# Patient Record
Sex: MALE | Race: WHITE | NOT HISPANIC OR LATINO | Employment: FULL TIME | URBAN - METROPOLITAN AREA
[De-identification: names, ages, dates, MRNs, and addresses within clinical notes are randomized per-mention and may not be internally consistent; named-entity substitution may affect disease eponyms.]

---

## 2017-04-07 ENCOUNTER — COMMUNICATION - HEALTHEAST (OUTPATIENT)
Dept: INTERNAL MEDICINE | Facility: CLINIC | Age: 52
End: 2017-04-07

## 2017-04-07 ENCOUNTER — OFFICE VISIT - HEALTHEAST (OUTPATIENT)
Dept: INTERNAL MEDICINE | Facility: CLINIC | Age: 52
End: 2017-04-07

## 2017-04-07 DIAGNOSIS — I25.10 CAD (CORONARY ARTERY DISEASE): ICD-10-CM

## 2017-04-07 LAB
CHOLEST SERPL-MCNC: 85 MG/DL
FASTING STATUS PATIENT QL REPORTED: YES
HDLC SERPL-MCNC: 25 MG/DL
LDLC SERPL CALC-MCNC: 41 MG/DL
TRIGL SERPL-MCNC: 97 MG/DL

## 2017-04-07 ASSESSMENT — MIFFLIN-ST. JEOR: SCORE: 1796.74

## 2017-05-05 ENCOUNTER — COMMUNICATION - HEALTHEAST (OUTPATIENT)
Dept: INTERNAL MEDICINE | Facility: CLINIC | Age: 52
End: 2017-05-05

## 2017-05-05 DIAGNOSIS — J44.9 COPD (CHRONIC OBSTRUCTIVE PULMONARY DISEASE) (H): ICD-10-CM

## 2017-05-10 ENCOUNTER — COMMUNICATION - HEALTHEAST (OUTPATIENT)
Dept: INTERNAL MEDICINE | Facility: CLINIC | Age: 52
End: 2017-05-10

## 2017-05-31 ENCOUNTER — COMMUNICATION - HEALTHEAST (OUTPATIENT)
Dept: INTERNAL MEDICINE | Facility: CLINIC | Age: 52
End: 2017-05-31

## 2017-07-21 ENCOUNTER — OFFICE VISIT - HEALTHEAST (OUTPATIENT)
Dept: INTERNAL MEDICINE | Facility: CLINIC | Age: 52
End: 2017-07-21

## 2017-07-21 DIAGNOSIS — I25.10 CORONARY ARTERY DISEASE INVOLVING NATIVE HEART WITHOUT ANGINA PECTORIS, UNSPECIFIED VESSEL OR LESION TYPE: ICD-10-CM

## 2017-07-21 LAB
CHOLEST SERPL-MCNC: 106 MG/DL
FASTING STATUS PATIENT QL REPORTED: YES
HDLC SERPL-MCNC: 35 MG/DL
LDLC SERPL CALC-MCNC: 51 MG/DL
TRIGL SERPL-MCNC: 98 MG/DL

## 2017-07-21 ASSESSMENT — MIFFLIN-ST. JEOR: SCORE: 1801.28

## 2017-07-24 ENCOUNTER — COMMUNICATION - HEALTHEAST (OUTPATIENT)
Dept: INTERNAL MEDICINE | Facility: CLINIC | Age: 52
End: 2017-07-24

## 2017-08-31 ENCOUNTER — RECORDS - HEALTHEAST (OUTPATIENT)
Dept: ADMINISTRATIVE | Facility: OTHER | Age: 52
End: 2017-08-31

## 2017-10-19 ENCOUNTER — COMMUNICATION - HEALTHEAST (OUTPATIENT)
Dept: INTERNAL MEDICINE | Facility: CLINIC | Age: 52
End: 2017-10-19

## 2017-10-20 ENCOUNTER — COMMUNICATION - HEALTHEAST (OUTPATIENT)
Dept: INTERNAL MEDICINE | Facility: CLINIC | Age: 52
End: 2017-10-20

## 2017-10-20 ENCOUNTER — OFFICE VISIT - HEALTHEAST (OUTPATIENT)
Dept: INTERNAL MEDICINE | Facility: CLINIC | Age: 52
End: 2017-10-20

## 2017-10-20 DIAGNOSIS — Z00.00 ROUTINE GENERAL MEDICAL EXAMINATION AT A HEALTH CARE FACILITY: ICD-10-CM

## 2017-10-20 LAB
CHOLEST SERPL-MCNC: 89 MG/DL
FASTING STATUS PATIENT QL REPORTED: YES
HDLC SERPL-MCNC: 32 MG/DL
LDLC SERPL CALC-MCNC: 38 MG/DL
PSA SERPL-MCNC: 0.4 NG/ML (ref 0–3.5)
TRIGL SERPL-MCNC: 96 MG/DL

## 2017-10-20 ASSESSMENT — MIFFLIN-ST. JEOR: SCORE: 1787.67

## 2017-10-27 ENCOUNTER — RECORDS - HEALTHEAST (OUTPATIENT)
Dept: ADMINISTRATIVE | Facility: OTHER | Age: 52
End: 2017-10-27

## 2018-01-03 ENCOUNTER — OFFICE VISIT - HEALTHEAST (OUTPATIENT)
Dept: INTERNAL MEDICINE | Facility: CLINIC | Age: 53
End: 2018-01-03

## 2018-01-03 DIAGNOSIS — J20.9 ACUTE BRONCHITIS: ICD-10-CM

## 2018-01-03 ASSESSMENT — MIFFLIN-ST. JEOR: SCORE: 1778.6

## 2018-04-26 ENCOUNTER — COMMUNICATION - HEALTHEAST (OUTPATIENT)
Dept: INTERNAL MEDICINE | Facility: CLINIC | Age: 53
End: 2018-04-26

## 2018-04-26 DIAGNOSIS — J44.9 COPD (CHRONIC OBSTRUCTIVE PULMONARY DISEASE) (H): ICD-10-CM

## 2018-08-28 ENCOUNTER — RECORDS - HEALTHEAST (OUTPATIENT)
Dept: ADMINISTRATIVE | Facility: OTHER | Age: 53
End: 2018-08-28

## 2019-01-10 ENCOUNTER — RECORDS - HEALTHEAST (OUTPATIENT)
Dept: ADMINISTRATIVE | Facility: OTHER | Age: 54
End: 2019-01-10

## 2019-01-10 ENCOUNTER — OFFICE VISIT - HEALTHEAST (OUTPATIENT)
Dept: INTERNAL MEDICINE | Facility: CLINIC | Age: 54
End: 2019-01-10

## 2019-01-10 DIAGNOSIS — Z00.00 ROUTINE GENERAL MEDICAL EXAMINATION AT A HEALTH CARE FACILITY: ICD-10-CM

## 2019-01-10 DIAGNOSIS — Z23 NEED FOR PROPHYLACTIC VACCINATION AND INOCULATION AGAINST INFLUENZA: ICD-10-CM

## 2019-01-10 LAB
ALBUMIN SERPL-MCNC: 4.2 G/DL (ref 3.5–5)
ALBUMIN UR-MCNC: NEGATIVE MG/DL
ALP SERPL-CCNC: 83 U/L (ref 45–120)
ALT SERPL W P-5'-P-CCNC: 19 U/L (ref 0–45)
ANION GAP SERPL CALCULATED.3IONS-SCNC: 9 MMOL/L (ref 5–18)
APPEARANCE UR: CLEAR
AST SERPL W P-5'-P-CCNC: 16 U/L (ref 0–40)
BILIRUB SERPL-MCNC: 0.9 MG/DL (ref 0–1)
BILIRUB UR QL STRIP: NEGATIVE
BUN SERPL-MCNC: 17 MG/DL (ref 8–22)
CALCIUM SERPL-MCNC: 9 MG/DL (ref 8.5–10.5)
CHLORIDE BLD-SCNC: 104 MMOL/L (ref 98–107)
CHOLEST SERPL-MCNC: 100 MG/DL
CO2 SERPL-SCNC: 27 MMOL/L (ref 22–31)
COLOR UR AUTO: YELLOW
CREAT SERPL-MCNC: 1.26 MG/DL (ref 0.7–1.3)
ERYTHROCYTE [DISTWIDTH] IN BLOOD BY AUTOMATED COUNT: 11.6 % (ref 11–14.5)
FASTING STATUS PATIENT QL REPORTED: ABNORMAL
GFR SERPL CREATININE-BSD FRML MDRD: 60 ML/MIN/1.73M2
GLUCOSE BLD-MCNC: 106 MG/DL (ref 70–125)
GLUCOSE UR STRIP-MCNC: NEGATIVE MG/DL
HCT VFR BLD AUTO: 46.5 % (ref 40–54)
HDLC SERPL-MCNC: 34 MG/DL
HGB BLD-MCNC: 15.5 G/DL (ref 14–18)
HGB UR QL STRIP: NEGATIVE
KETONES UR STRIP-MCNC: NEGATIVE MG/DL
LDLC SERPL CALC-MCNC: 44 MG/DL
LEUKOCYTE ESTERASE UR QL STRIP: NEGATIVE
MCH RBC QN AUTO: 29.5 PG (ref 27–34)
MCHC RBC AUTO-ENTMCNC: 33.4 G/DL (ref 32–36)
MCV RBC AUTO: 88 FL (ref 80–100)
NITRATE UR QL: NEGATIVE
PH UR STRIP: 7 [PH] (ref 5–8)
PLATELET # BLD AUTO: 268 THOU/UL (ref 140–440)
PMV BLD AUTO: 7.9 FL (ref 7–10)
POTASSIUM BLD-SCNC: 4.4 MMOL/L (ref 3.5–5)
PROT SERPL-MCNC: 7.1 G/DL (ref 6–8)
PSA SERPL-MCNC: 0.3 NG/ML (ref 0–3.5)
RBC # BLD AUTO: 5.26 MILL/UL (ref 4.4–6.2)
SODIUM SERPL-SCNC: 140 MMOL/L (ref 136–145)
SP GR UR STRIP: 1.01 (ref 1–1.03)
TRIGL SERPL-MCNC: 109 MG/DL
TSH SERPL DL<=0.005 MIU/L-ACNC: 2.12 UIU/ML (ref 0.3–5)
UROBILINOGEN UR STRIP-ACNC: NORMAL
WBC: 7.2 THOU/UL (ref 4–11)

## 2019-01-10 ASSESSMENT — MIFFLIN-ST. JEOR: SCORE: 1778.6

## 2019-01-11 ENCOUNTER — COMMUNICATION - HEALTHEAST (OUTPATIENT)
Dept: INTERNAL MEDICINE | Facility: CLINIC | Age: 54
End: 2019-01-11

## 2019-02-05 ENCOUNTER — COMMUNICATION - HEALTHEAST (OUTPATIENT)
Dept: INTERNAL MEDICINE | Facility: CLINIC | Age: 54
End: 2019-02-05

## 2019-02-05 DIAGNOSIS — J44.9 COPD (CHRONIC OBSTRUCTIVE PULMONARY DISEASE) (H): ICD-10-CM

## 2019-04-25 ENCOUNTER — COMMUNICATION - HEALTHEAST (OUTPATIENT)
Dept: SCHEDULING | Facility: CLINIC | Age: 54
End: 2019-04-25

## 2019-05-10 ENCOUNTER — RECORDS - HEALTHEAST (OUTPATIENT)
Dept: ADMINISTRATIVE | Facility: OTHER | Age: 54
End: 2019-05-10

## 2019-06-27 ENCOUNTER — RECORDS - HEALTHEAST (OUTPATIENT)
Dept: ADMINISTRATIVE | Facility: OTHER | Age: 54
End: 2019-06-27

## 2019-07-12 ENCOUNTER — RECORDS - HEALTHEAST (OUTPATIENT)
Dept: ADMINISTRATIVE | Facility: OTHER | Age: 54
End: 2019-07-12

## 2019-08-27 ENCOUNTER — RECORDS - HEALTHEAST (OUTPATIENT)
Dept: ADMINISTRATIVE | Facility: OTHER | Age: 54
End: 2019-08-27

## 2020-01-24 ENCOUNTER — OFFICE VISIT - HEALTHEAST (OUTPATIENT)
Dept: INTERNAL MEDICINE | Facility: CLINIC | Age: 55
End: 2020-01-24

## 2020-01-24 DIAGNOSIS — Z00.00 ROUTINE GENERAL MEDICAL EXAMINATION AT A HEALTH CARE FACILITY: ICD-10-CM

## 2020-01-24 LAB
ALBUMIN SERPL-MCNC: 4.2 G/DL (ref 3.5–5)
ALBUMIN UR-MCNC: NEGATIVE MG/DL
ALP SERPL-CCNC: 85 U/L (ref 45–120)
ALT SERPL W P-5'-P-CCNC: 20 U/L (ref 0–45)
ANION GAP SERPL CALCULATED.3IONS-SCNC: 10 MMOL/L (ref 5–18)
APPEARANCE UR: CLEAR
AST SERPL W P-5'-P-CCNC: 18 U/L (ref 0–40)
BILIRUB SERPL-MCNC: 1 MG/DL (ref 0–1)
BILIRUB UR QL STRIP: NEGATIVE
BUN SERPL-MCNC: 15 MG/DL (ref 8–22)
CALCIUM SERPL-MCNC: 9 MG/DL (ref 8.5–10.5)
CHLORIDE BLD-SCNC: 105 MMOL/L (ref 98–107)
CHOLEST SERPL-MCNC: 97 MG/DL
CO2 SERPL-SCNC: 26 MMOL/L (ref 22–31)
COLOR UR AUTO: YELLOW
CREAT SERPL-MCNC: 1.27 MG/DL (ref 0.7–1.3)
ERYTHROCYTE [DISTWIDTH] IN BLOOD BY AUTOMATED COUNT: 11.9 % (ref 11–14.5)
FASTING STATUS PATIENT QL REPORTED: YES
GFR SERPL CREATININE-BSD FRML MDRD: 59 ML/MIN/1.73M2
GLUCOSE BLD-MCNC: 99 MG/DL (ref 70–125)
GLUCOSE UR STRIP-MCNC: NEGATIVE MG/DL
HCT VFR BLD AUTO: 45.9 % (ref 40–54)
HDLC SERPL-MCNC: 36 MG/DL
HGB BLD-MCNC: 16 G/DL (ref 14–18)
HGB UR QL STRIP: NEGATIVE
KETONES UR STRIP-MCNC: NEGATIVE MG/DL
LDLC SERPL CALC-MCNC: 43 MG/DL
LEUKOCYTE ESTERASE UR QL STRIP: NEGATIVE
MCH RBC QN AUTO: 30.7 PG (ref 27–34)
MCHC RBC AUTO-ENTMCNC: 34.8 G/DL (ref 32–36)
MCV RBC AUTO: 88 FL (ref 80–100)
NITRATE UR QL: NEGATIVE
PH UR STRIP: 6 [PH] (ref 5–8)
PLATELET # BLD AUTO: 274 THOU/UL (ref 140–440)
PMV BLD AUTO: 8.5 FL (ref 7–10)
POTASSIUM BLD-SCNC: 4.1 MMOL/L (ref 3.5–5)
PROT SERPL-MCNC: 6.6 G/DL (ref 6–8)
PSA SERPL-MCNC: 0.3 NG/ML (ref 0–3.5)
RBC # BLD AUTO: 5.22 MILL/UL (ref 4.4–6.2)
SODIUM SERPL-SCNC: 141 MMOL/L (ref 136–145)
SP GR UR STRIP: 1.02 (ref 1–1.03)
TRIGL SERPL-MCNC: 88 MG/DL
TSH SERPL DL<=0.005 MIU/L-ACNC: 2.21 UIU/ML (ref 0.3–5)
UROBILINOGEN UR STRIP-ACNC: NORMAL
WBC: 7 THOU/UL (ref 4–11)

## 2020-01-24 ASSESSMENT — MIFFLIN-ST. JEOR: SCORE: 1764.99

## 2020-01-27 ENCOUNTER — COMMUNICATION - HEALTHEAST (OUTPATIENT)
Dept: INTERNAL MEDICINE | Facility: CLINIC | Age: 55
End: 2020-01-27

## 2020-02-13 ENCOUNTER — COMMUNICATION - HEALTHEAST (OUTPATIENT)
Dept: INTERNAL MEDICINE | Facility: CLINIC | Age: 55
End: 2020-02-13

## 2020-02-13 DIAGNOSIS — J44.9 COPD (CHRONIC OBSTRUCTIVE PULMONARY DISEASE) (H): ICD-10-CM

## 2020-02-16 ENCOUNTER — COMMUNICATION - HEALTHEAST (OUTPATIENT)
Dept: INTERNAL MEDICINE | Facility: CLINIC | Age: 55
End: 2020-02-16

## 2020-02-16 DIAGNOSIS — J44.9 COPD (CHRONIC OBSTRUCTIVE PULMONARY DISEASE) (H): ICD-10-CM

## 2020-10-01 ENCOUNTER — RECORDS - HEALTHEAST (OUTPATIENT)
Dept: ADMINISTRATIVE | Facility: OTHER | Age: 55
End: 2020-10-01

## 2020-10-14 ENCOUNTER — COMMUNICATION - HEALTHEAST (OUTPATIENT)
Dept: INTERNAL MEDICINE | Facility: CLINIC | Age: 55
End: 2020-10-14

## 2020-10-14 DIAGNOSIS — Z00.00 ROUTINE GENERAL MEDICAL EXAMINATION AT A HEALTH CARE FACILITY: ICD-10-CM

## 2020-10-17 RX ORDER — ATORVASTATIN CALCIUM 80 MG/1
TABLET, FILM COATED ORAL
Qty: 90 TABLET | Refills: 3 | Status: SHIPPED | OUTPATIENT
Start: 2020-10-17 | End: 2024-08-01

## 2020-10-18 ENCOUNTER — COMMUNICATION - HEALTHEAST (OUTPATIENT)
Dept: INTERNAL MEDICINE | Facility: CLINIC | Age: 55
End: 2020-10-18

## 2020-10-18 DIAGNOSIS — E88.810 METABOLIC SYNDROME: ICD-10-CM

## 2020-10-20 RX ORDER — LOSARTAN POTASSIUM 50 MG/1
TABLET ORAL
Qty: 90 TABLET | Refills: 0 | Status: SHIPPED | OUTPATIENT
Start: 2020-10-20 | End: 2022-04-26 | Stop reason: DRUGHIGH

## 2020-12-22 ENCOUNTER — RECORDS - HEALTHEAST (OUTPATIENT)
Dept: ADMINISTRATIVE | Facility: OTHER | Age: 55
End: 2020-12-22

## 2021-01-22 ENCOUNTER — RECORDS - HEALTHEAST (OUTPATIENT)
Dept: ADMINISTRATIVE | Facility: OTHER | Age: 56
End: 2021-01-22

## 2021-01-29 ENCOUNTER — OFFICE VISIT - HEALTHEAST (OUTPATIENT)
Dept: INTERNAL MEDICINE | Facility: CLINIC | Age: 56
End: 2021-01-29

## 2021-01-29 DIAGNOSIS — Z00.00 ROUTINE GENERAL MEDICAL EXAMINATION AT A HEALTH CARE FACILITY: ICD-10-CM

## 2021-01-29 LAB
ALBUMIN SERPL-MCNC: 4.1 G/DL (ref 3.5–5)
ALBUMIN UR-MCNC: NEGATIVE MG/DL
ALP SERPL-CCNC: 82 U/L (ref 45–120)
ALT SERPL W P-5'-P-CCNC: 21 U/L (ref 0–45)
ANION GAP SERPL CALCULATED.3IONS-SCNC: 10 MMOL/L (ref 5–18)
APPEARANCE UR: CLEAR
AST SERPL W P-5'-P-CCNC: 16 U/L (ref 0–40)
BILIRUB SERPL-MCNC: 0.8 MG/DL (ref 0–1)
BILIRUB UR QL STRIP: NEGATIVE
BUN SERPL-MCNC: 17 MG/DL (ref 8–22)
CALCIUM SERPL-MCNC: 9.3 MG/DL (ref 8.5–10.5)
CHLORIDE BLD-SCNC: 104 MMOL/L (ref 98–107)
CHOLEST SERPL-MCNC: 81 MG/DL
CO2 SERPL-SCNC: 26 MMOL/L (ref 22–31)
COLOR UR AUTO: YELLOW
CREAT SERPL-MCNC: 1.33 MG/DL (ref 0.7–1.3)
ERYTHROCYTE [DISTWIDTH] IN BLOOD BY AUTOMATED COUNT: 12.2 % (ref 11–14.5)
FASTING STATUS PATIENT QL REPORTED: YES
GFR SERPL CREATININE-BSD FRML MDRD: 56 ML/MIN/1.73M2
GLUCOSE BLD-MCNC: 95 MG/DL (ref 70–125)
GLUCOSE UR STRIP-MCNC: NEGATIVE MG/DL
HCT VFR BLD AUTO: 42.7 % (ref 40–54)
HDLC SERPL-MCNC: 31 MG/DL
HGB BLD-MCNC: 14.6 G/DL (ref 14–18)
HGB UR QL STRIP: NEGATIVE
KETONES UR STRIP-MCNC: NEGATIVE MG/DL
LDLC SERPL CALC-MCNC: 31 MG/DL
LEUKOCYTE ESTERASE UR QL STRIP: NEGATIVE
MCH RBC QN AUTO: 29.9 PG (ref 27–34)
MCHC RBC AUTO-ENTMCNC: 34.2 G/DL (ref 32–36)
MCV RBC AUTO: 87 FL (ref 80–100)
NITRATE UR QL: NEGATIVE
PH UR STRIP: 6.5 [PH] (ref 5–8)
PLATELET # BLD AUTO: 277 THOU/UL (ref 140–440)
PMV BLD AUTO: 10.7 FL (ref 7–10)
POTASSIUM BLD-SCNC: 4.6 MMOL/L (ref 3.5–5)
PROT SERPL-MCNC: 6.5 G/DL (ref 6–8)
PSA SERPL-MCNC: 0.4 NG/ML (ref 0–3.5)
RBC # BLD AUTO: 4.89 MILL/UL (ref 4.4–6.2)
SODIUM SERPL-SCNC: 140 MMOL/L (ref 136–145)
SP GR UR STRIP: 1.02 (ref 1–1.03)
TRIGL SERPL-MCNC: 95 MG/DL
TSH SERPL DL<=0.005 MIU/L-ACNC: 2.63 UIU/ML (ref 0.3–5)
UROBILINOGEN UR STRIP-ACNC: NORMAL
WBC: 6 THOU/UL (ref 4–11)

## 2021-01-29 ASSESSMENT — MIFFLIN-ST. JEOR: SCORE: 1774.06

## 2021-02-01 ENCOUNTER — COMMUNICATION - HEALTHEAST (OUTPATIENT)
Dept: INTERNAL MEDICINE | Facility: CLINIC | Age: 56
End: 2021-02-01

## 2021-02-10 ENCOUNTER — COMMUNICATION - HEALTHEAST (OUTPATIENT)
Dept: INTERNAL MEDICINE | Facility: CLINIC | Age: 56
End: 2021-02-10

## 2021-02-10 DIAGNOSIS — J44.9 COPD (CHRONIC OBSTRUCTIVE PULMONARY DISEASE) (H): ICD-10-CM

## 2021-03-21 ENCOUNTER — COMMUNICATION - HEALTHEAST (OUTPATIENT)
Dept: INTERNAL MEDICINE | Facility: CLINIC | Age: 56
End: 2021-03-21

## 2021-04-13 ENCOUNTER — RECORDS - HEALTHEAST (OUTPATIENT)
Dept: ADMINISTRATIVE | Facility: OTHER | Age: 56
End: 2021-04-13

## 2021-04-13 ENCOUNTER — COMMUNICATION - HEALTHEAST (OUTPATIENT)
Dept: INTERNAL MEDICINE | Facility: CLINIC | Age: 56
End: 2021-04-13

## 2021-04-20 ENCOUNTER — RECORDS - HEALTHEAST (OUTPATIENT)
Dept: ADMINISTRATIVE | Facility: OTHER | Age: 56
End: 2021-04-20

## 2021-05-16 ENCOUNTER — COMMUNICATION - HEALTHEAST (OUTPATIENT)
Dept: INTERNAL MEDICINE | Facility: CLINIC | Age: 56
End: 2021-05-16

## 2021-05-16 DIAGNOSIS — J44.9 COPD (CHRONIC OBSTRUCTIVE PULMONARY DISEASE) (H): ICD-10-CM

## 2021-05-27 ENCOUNTER — COMMUNICATION - HEALTHEAST (OUTPATIENT)
Dept: INTERNAL MEDICINE | Facility: CLINIC | Age: 56
End: 2021-05-27

## 2021-05-27 DIAGNOSIS — J44.9 COPD (CHRONIC OBSTRUCTIVE PULMONARY DISEASE) (H): ICD-10-CM

## 2021-05-28 NOTE — TELEPHONE ENCOUNTER
Took a puck the shin, just below the knee.  1.5 weeks ago.    Did not injure the ankle.He states he injured below the knee, and there was a bruise and swelling at the ankle.Swelled up after he got hit the puck.  He is concerned because the injury was right below the knee, but his ankle turned black and blue and became swelled up.  Patient advised that the bruise and swelling  Appeared at lower level , because of gravity in the body.     Went for a run the next day.He reports;   Bruising and ankle swelling has increased over the past week.    Patient advised to rest area with ice, elevation.  Also advised to go swimming, and then rotate between pool and hot tub, very therapeutic for soft  tissue injuries.  Patient advised to quit doing things like playing hockey, and also running.  Ok to keep being somewhat active, but to give it a rest right now until healed better.    Patient advised to call again if still   Having issues with healing.  Carmen Morse RN  [facility] Triage/refill nurse            Reason for Disposition    Thigh, calf, or ankle swelling in only one leg    Protocols used: LEG SWELLING AND EDEMA-A-OH

## 2021-05-30 VITALS — WEIGHT: 210 LBS | BODY MASS INDEX: 29.4 KG/M2 | HEIGHT: 71 IN

## 2021-05-31 VITALS — WEIGHT: 211 LBS | HEIGHT: 71 IN | BODY MASS INDEX: 29.54 KG/M2

## 2021-05-31 VITALS — WEIGHT: 206 LBS | BODY MASS INDEX: 28.84 KG/M2 | HEIGHT: 71 IN

## 2021-05-31 VITALS — HEIGHT: 71 IN | BODY MASS INDEX: 29.12 KG/M2 | WEIGHT: 208 LBS

## 2021-06-02 VITALS — BODY MASS INDEX: 28.84 KG/M2 | WEIGHT: 206 LBS | HEIGHT: 71 IN

## 2021-06-04 VITALS
WEIGHT: 203 LBS | HEART RATE: 60 BPM | DIASTOLIC BLOOD PRESSURE: 82 MMHG | BODY MASS INDEX: 28.42 KG/M2 | HEIGHT: 71 IN | OXYGEN SATURATION: 98 % | SYSTOLIC BLOOD PRESSURE: 134 MMHG

## 2021-06-05 VITALS
BODY MASS INDEX: 28.7 KG/M2 | TEMPERATURE: 97 F | SYSTOLIC BLOOD PRESSURE: 132 MMHG | HEIGHT: 71 IN | OXYGEN SATURATION: 99 % | WEIGHT: 205 LBS | HEART RATE: 55 BPM | DIASTOLIC BLOOD PRESSURE: 82 MMHG

## 2021-06-05 NOTE — PROGRESS NOTES
Office Visit - Physical    Oz Atkinson   54 y.o. male    Date of Visit: 2020    Chief Complaint   Patient presents with     Annual Exam     Physical Exam   fasting       Subjective: Physical examination.    54-year-old  here for physical examination.  Seen by Dr. Rodriguez on 2019 [facility] cardiology.  Questions regarding nasal injection.  Volume of breath is decreased.    Colonoscopy dated 2016 showed one isolated hyperplastic polyp with Dr. Ivonne Mendoza.    Non-smoker minimal alcohol no known drug allergies.    ROS: A comprehensive review of systems was performed and was otherwise negative    Medications:   Prior to Admission medications    Medication Sig Start Date End Date Taking? Authorizing Provider   ADVAIR DISKUS 250-50 mcg/dose DISKUS TAKE 1 PUFF BY MOUTH TWICE A DAY 19  Yes Carlos Ferreira MD   aspirin 81 mg chewable tablet 81 mg. 6/26/15  Yes PROVIDER, HISTORICAL   atorvastatin (LIPITOR) 80 MG tablet Take 80 mg by mouth. 6/26/15  Yes PROVIDER, HISTORICAL   losartan (COZAAR) 50 MG tablet Take 50 mg by mouth. 18  Yes PROVIDER, HISTORICAL       Allergies:No Known Allergies    Immunizations:   Immunization History   Administered Date(s) Administered     DT (pediatric) 1999     Influenza,seasonal quad, PF, =/> 6months 10/20/2017, 01/10/2019     Influenza,seasonal,quad inj =/> 6months 10/07/2016     Td,adult,historic,unspecified 2009     Tdap 2009       Health Maintenance: Immunizations reviewed and up-to-date.    Past Medical History: Prior history of coronary artery disease with 1 stent deployed to LAD after myocardial infarction.  History of obstructive sleep apnea and has not worn CPAP mask for over 6 months.    Past Surgical History: Tympanoplasty right side.    Tonsillectomy.    Family History: Mother  pancreatic cancer 67.    Father  heart disease and uremia at age 74.  3 children well wife of breast  cancer survivor ductal carcinoma [place] in situ.    Social History: Exercises regularly hockey.  .    Exam Chest clear to auscultation and percussion.  Heart tones regular rhythm without murmur rub or gallop.  Abdomen soft nontender no organomegaly.  No peritoneal signs.  Extremities free of edema cyanosis or clubbing.  Neck veins nondistended no thyromegaly or scleral icterus noted, carotids full.  Skin warm and dry easily conversant good spirited.  Normal intelligence.  Neurologically intact no gross localizing findings.  Skin negative lymph negative neuro negative psych normal HEENT negative back straight no severe spine tenderness rest examination negative in its entirety including genital rectal exam small prostate good pulse noted in all 4 extremities.  Eyes ears nose throat examination negative.    Assessment and Plan  General medical examination done  54 years of age with premature coronary artery disease history of myocardial infarction history of 1 stent deployed LAD 5 years prior followed by Dr. Rodriguez [facility] cardiology.  After 20 to 25 minutes of exercise the patient has a chest discomfort just to the left of the sternal border there is no rash.  Suggest stress nuclear study for further evaluation.  May require coronary arteriography.  Today as part of his full physical examination check hemogram comprehensive metabolic profile urinalysis lipid panel PSA TSH.  For further evaluation in terms of stress testing and coronary arteriography I advised the patient to reconsult his cardiologist Dr. Rodriguez at [facility].    The following high BMI interventions were performed this visit: encouragement to exercise    Carlos Ferreira MD    Patient Active Problem List   Diagnosis     Sinus Bradycardia     Essential Hypertension     Adult Sleep Apnea     General medical exam

## 2021-06-06 NOTE — TELEPHONE ENCOUNTER
RN cannot approve Refill Request    RN can NOT refill this medication Protocol failed and NO refill given. Last office visit: 7/21/2017 Carlos Ferreira MD Last Physical: 1/24/2020 Last MTM visit: Visit date not found Last visit same specialty: 1/3/2018 Nickolas Cary MD.  Next visit within 3 mo: Visit date not found  Next physical within 3 mo: Visit date not found      Sue Garcia, [facility] Triage/Med Refill 2/16/2020    Requested Prescriptions   Pending Prescriptions Disp Refills     ADVAIR DISKUS 250-50 mcg/dose DISKUS [Pharmacy Med Name: ADVAIR 250-50 DISKUS] 180 puff 3     Sig: TAKE 1 PUFF BY MOUTH TWICE A DAY       There is no refill protocol information for this order

## 2021-06-09 NOTE — PROGRESS NOTES
Office Visit - Follow up    Oz Atkinson   52 y.o. male    Date of Visit: 4/7/2017    Chief Complaint   Patient presents with     Coronary Artery Disease     Hypertension       Subjective: Nancy artery disease hypertension.  Fasting with hyperlipidemia as well.    Musculoskeletal chest wall pain for this  52 years of age.  The patient in June 2015 had a myocardial infarction hospitalized and cared for at [facility] one coronary stent deployed to the LAD.  The patient denies any chest pain with activity no shortness of breath no nausea or vomiting last night he ran 15 minutes then walked for an hour without chest pain.  No exertional syncope.  No exertional shortness of breath.    No blood in stool or urine medication list reviewed generally well-tolerated history of adult sleep apnea as well.    ROS: A comprehensive review of systems was performed and was otherwise negative    Medications:  Prior to Admission medications    Medication Sig Start Date End Date Taking? Authorizing Provider   aspirin 81 mg chewable tablet 81 mg. 6/26/15  Yes PROVIDER, HISTORICAL   atorvastatin (LIPITOR) 80 MG tablet Take 80 mg by mouth. 6/26/15  Yes PROVIDER, HISTORICAL   fluticasone-salmeterol (ADVAIR DISKUS) 250-50 mcg/dose DISKUS TAKE ONE PUFF BY MOUTH TWICE DAILY 8/19/16  Yes Carlos Ferreira MD   losartan (COZAAR) 25 MG tablet Take 1 tablet (25 mg total) by mouth daily. 8/5/15   Carlos Ferreira MD       Allergies: No Known Allergies    Immunizations:   Immunization History   Administered Date(s) Administered     DT (pediatric) 01/01/1999     Influenza, seasonal,quad inj 36+ mos 10/07/2016     Td, historic 06/11/2009     Tdap 06/11/2009       Exam Chest clear to auscultation and percussion.  Heart tones regular rhythm without murmur rub or gallop.  Abdomen soft nontender no organomegaly.  No peritoneal signs.  Extremities free of edema cyanosis or clubbing.  Neck veins nondistended no thyromegaly  or scleral icterus noted, carotids full.  Skin warm and dry easily conversant good spirited.  Normal intelligence.  Neurologically intact no gross localizing findings.    Assessment and Plan  Coronary artery disease with history of coronary stent deployed to LAD with hypertension and hyperlipidemia.  Recommend drive the LDL as low as possible keep blood pressure controlled diet and exercise.  Overweight BMI 29.71 discussed at length check lipid panel today.    Time: total time spent with the patient was 25 minutes of which >50% was spent in counseling and coordination of care    The following high BMI interventions were performed this visit: encouragement to exercise    Carlos Ferreira MD    Patient Active Problem List   Diagnosis     Sinus Bradycardia     Essential Hypertension     Adult Sleep Apnea     General medical exam

## 2021-06-12 NOTE — PROGRESS NOTES
Office Visit - Follow up    Oz Atkinson   52 y.o. male    Date of Visit: 7/21/2017    Chief Complaint   Patient presents with     Hypertension     Coronary Artery Disease     Medication Questions     advair       Subjective: Coronary artery disease with history of stent to LAD [facility].    History of hypertension as well as hyperlipidemia.  Questions regarding Advair for exercise-induced asthma answered.  Safety emphasized.  Sporadic chest pain related to stress test chest tightness.  Suggest follow-up with Dr. Apple Rodriguez at [facility] Department of cardiology.    No blood in stool or urine no chest pain with exertion no syncope no palpitations no shortness of breath with activity or at rest.    Medication list reviewed generally well-tolerated.    ROS: A comprehensive review of systems was performed and was otherwise negative    Medications:  Prior to Admission medications    Medication Sig Start Date End Date Taking? Authorizing Provider   aspirin 81 mg chewable tablet 81 mg. 6/26/15  Yes PROVIDER, HISTORICAL   atorvastatin (LIPITOR) 80 MG tablet Take 80 mg by mouth. 6/26/15  Yes PROVIDER, HISTORICAL   fluticasone-salmeterol (ADVAIR DISKUS) 250-50 mcg/dose DISKUS TAKE ONE PUFF BY MOUTH TWICE DAILY 5/5/17  Yes Loy Phillips MD   losartan (COZAAR) 25 MG tablet Take 1 tablet (25 mg total) by mouth daily. 8/5/15  Yes Carlos Ferreira MD       Allergies: No Known Allergies    Immunizations:   Immunization History   Administered Date(s) Administered     DT (pediatric) 01/01/1999     Influenza, seasonal,quad inj 36+ mos 10/07/2016     Td, historic 06/11/2009     Tdap 06/11/2009       Exam Chest clear to auscultation and percussion.  Heart tones regular rhythm without murmur rub or gallop.  Abdomen soft nontender no organomegaly.  No peritoneal signs.  Extremities free of edema cyanosis or clubbing.  Neck veins nondistended no thyromegaly or scleral icterus noted, carotids full.  Skin  warm and dry easily conversant good spirited.  Normal intelligence.  Neurologically intact no gross localizing findings.   by training.  And occupation.  Enjoys work.    Assessment and Plan  Coronary artery disease with history of coronary stenting LAD.  Stable.  Atypical chest pain suggest consultation again with consulting cardiologist Dr. Apple Rodriguez [facility] check lipid panel today.  Return to clinic October 2017 for full physical exam.    Hypertension and hyperlipidemia.  Weight up 1 pound.    Colon polyp hyperplastic in type see colonoscopy report September 27, 2016 Dr. Ivonne Sierra presiding.    Time: total time spent with the patient was 25 minutes of which >50% was spent in counseling and coordination of care    The following high BMI interventions were performed this visit: encouragement to exercise    Carlos Ferreira MD    Patient Active Problem List   Diagnosis     Sinus Bradycardia     Essential Hypertension     Adult Sleep Apnea     General medical exam

## 2021-06-12 NOTE — TELEPHONE ENCOUNTER
RN cannot approve Refill Request    RN can NOT refill this medication historical medication requested. Last office visit: 7/21/2017 Carlos Ferreira MD Last Physical: 1/24/2020 Last MTM visit: Visit date not found Last visit same specialty: 1/3/2018 Nickolas Cary MD.  Next visit within 3 mo: Visit date not found  Next physical within 3 mo: Visit date not found      Betty Manuel, [facility] Triage/Med Refill 10/20/2020    Requested Prescriptions   Pending Prescriptions Disp Refills     losartan (COZAAR) 50 MG tablet [Pharmacy Med Name: LOSARTAN POTASSIUM 50 MG TAB] 90 tablet 0     Sig: TAKE 1 TABLET BY MOUTH EVERY DAY       Angiotensin Receptor Blocker Protocol Passed - 10/20/2020  5:16 AM        Passed - PCP or prescribing provider visit in past 12 months       Last office visit with prescriber/PCP: 7/21/2017 Carlos Ferreira MD OR same dept: Visit date not found OR same specialty: 1/3/2018 Nickolas Cary MD  Last physical: 1/24/2020 Last MTM visit: Visit date not found   Next visit within 3 mo: Visit date not found  Next physical within 3 mo: Visit date not found  Prescriber OR PCP: Carlos Ferreira MD  Last diagnosis associated with med order: There are no diagnoses linked to this encounter.  If protocol passes may refill for 12 months if within 3 months of last provider visit (or a total of 15 months).             Passed - Serum potassium within the past 12 months     Lab Results   Component Value Date    Potassium 4.1 01/24/2020             Passed - Blood pressure filed in past 12 months     BP Readings from Last 1 Encounters:   01/24/20 134/82             Passed - Serum creatinine within the past 12 months     Creatinine   Date Value Ref Range Status   01/24/2020 1.27 0.70 - 1.30 mg/dL Final

## 2021-06-13 NOTE — PROGRESS NOTES
Office Visit - Physical    Oz Atkinson   52 y.o. male    Date of Visit: 10/20/2017    Chief Complaint   Patient presents with     Annual Exam     Physical Exam   fasting       Subjective: Physical exam.    52-year-old  here for physical examination.  Occasional chest twinge history of coronary disease with stent to LAD.  Easily relieved with rest.    Colonoscopy showed normal findings except for one hyperplastic polyp date of colonoscopy with Dr. Ivonne Sierra September 2, 2016.    Non-smoker light alcohol allergies none known.  Questions regarding sleep apnea previously suggested consultation with Dr. Addison Sharpe.  Prior history of LUIS and CPAP.    ROS: A comprehensive review of systems was performed and was otherwise negative    Medications:   Prior to Admission medications    Medication Sig Start Date End Date Taking? Authorizing Provider   aspirin 81 mg chewable tablet 81 mg. 6/26/15  Yes PROVIDER, HISTORICAL   atorvastatin (LIPITOR) 80 MG tablet Take 80 mg by mouth. 6/26/15  Yes PROVIDER, HISTORICAL   fluticasone-salmeterol (ADVAIR DISKUS) 250-50 mcg/dose DISKUS TAKE ONE PUFF BY MOUTH TWICE DAILY 5/5/17  Yes Loy Phillips MD   losartan (COZAAR) 25 MG tablet Take 1 tablet (25 mg total) by mouth daily. 8/5/15  Yes Carlos Ferreira MD       Allergies:No Known Allergies    Immunizations:   Immunization History   Administered Date(s) Administered     DT (pediatric) 01/01/1999     Influenza, seasonal,quad inj 36+ mos 10/07/2016     Influenza,seasonal quad, PF, 36+MOS 10/20/2017     Td, historic 06/11/2009     Tdap 06/11/2009       Health Maintenance: Immunizations reviewed and up-to-date.  Flu shot given left deltoid.    Past Medical History: Hypertension and hyperlipidemia and coronary artery disease with history of myocardial infarction June 2015 [facility] followed by cardiology service Dr. Rodriguez.  One stent has been deployed to LAD.  Chest wall pain discussed.  Chest pain  with exercise also likely angina pectoris but stable.  Predictable.  Not unstable.    Past Surgical History: Tonsillectomy and tympanoplasty.    Family History: Mother  pancreatic cancer age 67.    Father  heart disease and uremia age 74.  3 children well wife well breast cancer survivor she had ductal carcinoma in situ.    Social History: Enjoys playing hockey exercises regularly enjoys swimming.    .    Exam Chest clear to auscultation and percussion.  Heart tones regular rhythm without murmur rub or gallop.  Abdomen soft nontender no organomegaly.  No peritoneal signs.  Extremities free of edema cyanosis or clubbing.  Neck veins nondistended no thyromegaly or scleral icterus noted, carotids full.  Skin warm and dry easily conversant good spirited.  Normal intelligence.  Neurologically intact no gross localizing findings.  Rest of examination negative in its entirety including skin negative lymph negative neuro negative psych normal HEENT negative back straight no severe spine tenderness general rectal exam negative mild prostatic enlargement 1-1-1/2 over 4 without nodularity nothing to suggest underlying malignancy good pulses noted in all 4 extremities no carotid bruits.  No thyromegaly.    Assessment and Plan  General medical examination at healthcare facility and 52-year-old male with underlying coronary artery disease and coronary stent.  Followed by cardiology Dr. Rodriguez at [facility] encourage patient to continue this close cardiac follow-up.  BPH is well check hemogram plus conference of metabolic profile urinalysis lipid panel PSA TSH.    Hyperplastic colon polyp seen in colonoscopy dated 2016 Dr. Ivonne Sierra presiding.  Flu vaccine today.  Same meds and cares.  Hypertension and hyperlipidemia.    The following high BMI interventions were performed this visit: encouragement to exercise    Carlos Ferreira MD    Patient Active Problem List   Diagnosis      Sinus Bradycardia     Essential Hypertension     Adult Sleep Apnea     General medical exam

## 2021-06-14 NOTE — PROGRESS NOTES
Office Visit - Physical    Oz Atkinson   55 y.o. male    Date of Visit: 1/29/2021    Chief Complaint   Patient presents with     Annual Exam     Physical Exam   fasting       Subjective: Physical examination.    55-year-old  here for physical examination.  This winter asthma worse.  Previously seen by Dr. Addison Sharpe of [place] [facility].  Previously had had a sleep study.  Suggest reconsultation.    Excessive phlegm.    Non-smoker social alcohol.  No known drug allergies.    Colonoscopy dated September 27, 2016 showed 1 polyp otherwise allCLEAR Dr. Ivonne Sierra colorectal [facility] presiding.        ROS: A comprehensive review of systems was performed and was otherwise negative    Medications:   Prior to Admission medications    Medication Sig Start Date End Date Taking? Authorizing Provider   ADVAIR DISKUS 250-50 mcg/dose DISKUS TAKE 1 PUFF BY MOUTH TWICE A DAY 2/16/20  Yes Carlos Ferreira MD   aspirin 81 mg chewable tablet 81 mg. 6/26/15  Yes PROVIDER, HISTORICAL   atorvastatin (LIPITOR) 80 MG tablet TAKE 1 TABLET BY MOUTH EVERYDAY AT BEDTIME 10/17/20  Yes Carlos Ferreira MD   losartan (COZAAR) 50 MG tablet TAKE 1 TABLET BY MOUTH EVERY DAY 10/20/20  Yes Carlos Ferreira MD       Allergies:No Known Allergies    Immunizations:   Immunization History   Administered Date(s) Administered     DT (pediatric) 01/01/1999     INFLUENZA,SEASONAL QUAD, PF, =/> 6months 10/20/2017, 01/10/2019     Influenza,seasonal,quad inj =/> 6months 10/07/2016     Td,adult,historic,unspecified 06/11/2009     Tdap 06/11/2009       Health Maintenance: Immunizations reviewed and up-to-date.  Followed by Dr. Rodriguez from [facility] division of cardiology.    Past Medical History: Prior history of myocardial infarction with coronary stent deployed.  Recent stress test done showing an area of apical inferior infarction with associated surrounding ischemia this was small.  Ejection fraction 66%  discussed with patient in detail.    Intolerant of CPAP mask for LUIS.    Past Surgical History: Tympanoplasty right side.  Tonsillectomy.    Family History: Mother  pancreatic cancer 67.    Father  heart disease and uremia 74.    3 children well.    Wife  breast cancer living and a survivor.    Social History: Continues to play hockey.  .    Exam Chest clear to auscultation and percussion.  Heart tones regular rhythm without murmur rub or gallop.  Abdomen soft nontender no organomegaly.  No peritoneal signs.  Extremities free of edema cyanosis or clubbing.  Neck veins nondistended no thyromegaly or scleral icterus noted, carotids full.  Skin warm and dry easily conversant good spirited.  Normal intelligence.  Neurologically intact no gross localizing findings.  Excellent neuromuscular tone BMI rising at 29.  Discussed.  Skin negative lymph negative neuro negative psych normal HEENT negative blood per scarring right tympanic membrane from previous tympanoplasty back straight no severe spine tenderness genital rectal exam negative small prostate no nodularity good pulse noted in all 4 extremities no carotid bruits or thyromegaly.    70-1/2 inches tall 205 pounds BMI 29+    132/82 pulse 55 respirations 18 O2 sats room air 99% temperature this morning 97 degrees.  Discussed and reviewed recent stress test done see above.  Patient reassured.    Assessment and Plan  General medical examination at health care facility.  Today check hemogram comprehensive metabolic profile urinalysis lipid panel PSA TSH.    The following high BMI interventions were performed this visit: encouragement to exercise    Carlos Ferreira MD    Patient Active Problem List   Diagnosis     Sinus Bradycardia     Essential Hypertension     Adult Sleep Apnea     General medical exam

## 2021-06-15 NOTE — PROGRESS NOTES
"  Office Visit - Follow Up   Oz Atkinson   52 y.o. male    Date of Visit: 1/3/2018    Chief Complaint   Patient presents with     Cough     Cough, congestion, lungs hurt, lungs hurt when he tries to run, phlem is yellowish - had flu sx last week, that has resolved        Assessment and Plan   1. Acute bronchitis  He has an Advair Diskus at home that he uses as needed.  Encouraged him to use this regularly for the next 4-7 days.  Will add a Z-Russell take as directed.  He is still having some success with 12 hour Sudafed he will continue this.          No Follow-up on file.     History of Present Illness   This 52 y.o. old reports that one week ago she had the onset o prominent chills and aching.  These symptoms have resolved.  In the last few days he has been coughing up thick yellow to green phlegm.  He has had some benefit with 12 hour Sudafed twice per day.  He is a non-smoker.  He runs on a regular basis but he feels like his lungs hurt and cold exposure now when running.  At rest or with deep breath his lungs feel okay    Review of Systems: A comprehensive review of systems was negative except as noted.     Medications, Allergies and Problem List   Reviewed and updated     Physical Exam   General Appearance:       /84 (Patient Site: Left Arm, Patient Position: Sitting, Cuff Size: Adult Large)  Pulse (!) 59  Ht 5' 10.5\" (1.791 m)  Wt 206 lb (93.4 kg)  SpO2 99%  BMI 29.14 kg/m2    His lungs sound clear.  I do not hear any wheezes now.  Neck shows no adenopathy.  Nose is not congested.     Additional Information   Current Outpatient Prescriptions   Medication Sig Dispense Refill     aspirin 81 mg chewable tablet 81 mg.       atorvastatin (LIPITOR) 80 MG tablet Take 80 mg by mouth.       fluticasone-salmeterol (ADVAIR DISKUS) 250-50 mcg/dose DISKUS TAKE ONE PUFF BY MOUTH TWICE DAILY 3 puff 3     losartan (COZAAR) 25 MG tablet Take 1 tablet (25 mg total) by mouth daily. 30 tablet 11     azithromycin " (ZITHROMAX) 250 MG tablet Take 2 tablets by mouth day one and 1 tablet by mouth days 2-5 6 tablet 0     No current facility-administered medications for this visit.      No Known Allergies  Social History   Substance Use Topics     Smoking status: Never Smoker     Smokeless tobacco: Never Used      Comment: parents smoked     Alcohol use 1.8 oz/week     3 Cans of beer per week       Review and/or order of clinical lab tests:  Review and/or order of radiology tests:  Review and/or order of medicine tests:  Discussion of test results with performing physician:  Decision to obtain old records and/or obtain history from someone other than the patient:  Review and summarization of old records and/or obtaining history from someone other than the patient and.or discussion of case with another health care provider:  Independent visualization of image, tracing or specimen itself:    Time: total time spent with the patient was 15 minutes of which >50% was spent in counseling and coordination of care     Nickolas Cary MD

## 2021-06-15 NOTE — TELEPHONE ENCOUNTER
RN cannot approve Refill Request    RN can NOT refill this medication med is not covered by policy/route to provider. Last office visit: 7/21/2017 Carlos Ferreira MD Last Physical: 1/29/2021 Last MTM visit: Visit date not found Last visit same specialty: 1/3/2018 Nickolas Cary MD.  Next visit within 3 mo: Visit date not found  Next physical within 3 mo: Visit date not found      Steffen Hunter, [facility] Triage/Med Refill 2/10/2021    Requested Prescriptions   Pending Prescriptions Disp Refills     ADVAIR DISKUS 250-50 mcg/dose DISKUS [Pharmacy Med Name: ADVAIR 250-50 DISKUS]  3     Sig: INHALE 1 PUFF BY MOUTH TWICE A DAY       There is no refill protocol information for this order

## 2021-06-17 NOTE — TELEPHONE ENCOUNTER
RN cannot approve Refill Request    RN can NOT refill this medication Protocol failed and NO refill given. Last office visit: 7/21/2017 Carlos Ferreira MD Last Physical: 1/29/2021 Last MTM visit: Visit date not found Last visit same specialty: 1/3/2018 Nickolas Cary MD.  Next visit within 3 mo: Visit date not found  Next physical within 3 mo: Visit date not found      Sue Garcia, [facility] Triage/Med Refill 5/16/2021    Requested Prescriptions   Pending Prescriptions Disp Refills     ADVAIR DISKUS 250-50 mcg/dose DISKUS [Pharmacy Med Name: ADVAIR 250-50 DISKUS]  1     Sig: TAKE 1 PUFF BY MOUTH TWICE A DAY       There is no refill protocol information for this order

## 2021-06-18 NOTE — LETTER
Letter by Carlos Ferreira MD at      Author: Carlos Ferreira MD Service: -- Author Type: --    Filed:  Encounter Date: 1/11/2019 Status: (Other)       Oz Atkinson  [address] [place] [address]             January 11, 2019         Dear Mr. Atkinson,    Below are the results from your recent visit:    Resulted Orders   HM2(CBC w/o Differential)   Result Value Ref Range    WBC 7.2 4.0 - 11.0 thou/uL    RBC 5.26 4.40 - 6.20 mill/uL    Hemoglobin 15.5 14.0 - 18.0 g/dL    Hematocrit 46.5 40.0 - 54.0 %    MCV 88 80 - 100 fL    MCH 29.5 27.0 - 34.0 pg    MCHC 33.4 32.0 - 36.0 g/dL    RDW 11.6 11.0 - 14.5 %    Platelets 268 140 - 440 thou/uL    MPV 7.9 7.0 - 10.0 fL   Comprehensive Metabolic Panel   Result Value Ref Range    Sodium 140 136 - 145 mmol/L    Potassium 4.4 3.5 - 5.0 mmol/L    Chloride 104 98 - 107 mmol/L    CO2 27 22 - 31 mmol/L    Anion Gap, Calculation 9 5 - 18 mmol/L    Glucose 106 70 - 125 mg/dL    BUN 17 8 - 22 mg/dL    Creatinine 1.26 0.70 - 1.30 mg/dL    GFR MDRD Af Amer >60 >60 mL/min/1.73m2    GFR MDRD Non Af Amer 60 (L) >60 mL/min/1.73m2    Bilirubin, Total 0.9 0.0 - 1.0 mg/dL    Calcium 9.0 8.5 - 10.5 mg/dL    Protein, Total 7.1 6.0 - 8.0 g/dL    Albumin 4.2 3.5 - 5.0 g/dL    Alkaline Phosphatase 83 45 - 120 U/L    AST 16 0 - 40 U/L    ALT 19 0 - 45 U/L    Narrative    Fasting Glucose reference range is 70-99 mg/dL per  American Diabetes Association (ADA) guidelines.   Lipid Cascade   Result Value Ref Range    Cholesterol 100 <=199 mg/dL    Triglycerides 109 <=149 mg/dL    HDL Cholesterol 34 (L) >=40 mg/dL    LDL Calculated 44 <=129 mg/dL    Patient Fasting > 8hrs? Unknown    Thyroid Stimulating Hormone (TSH)   Result Value Ref Range    TSH 2.12 0.30 - 5.00 uIU/mL   Urinalysis-UC if Indicated   Result Value Ref Range    Color, UA Yellow Colorless, Yellow, Straw, Light Yellow    Clarity, UA Clear Clear    Glucose, UA Negative Negative    Bilirubin, UA Negative Negative     Ketones, UA Negative Negative    Specific Gravity, UA 1.015 1.005 - 1.030    Blood, UA Negative Negative    pH, UA 7.0 5.0 - 8.0    Protein, UA Negative Negative mg/dL    Urobilinogen, UA 0.2 E.U./dL 0.2 E.U./dL, 1.0 E.U./dL    Nitrite, UA Negative Negative    Leukocytes, UA Negative Negative    Narrative    Microscopic not indicated  UC not indicated   PSA (Prostatic-Specific Antigen), Annual Screen   Result Value Ref Range    PSA 0.3 0.0 - 3.5 ng/mL    Narrative    Method is Abbott Prostate-Specific Antigen (PSA)  Standard-WHO 1st International (90:10)       All very good results    Please call with questions or contact us using [employer].    Sincerely,        Electronically signed by Carlos Ferreira MD

## 2021-06-20 NOTE — LETTER
Letter by Carlos Ferreira MD at      Author: Carlos Ferreira MD Service: -- Author Type: --    Filed:  Encounter Date: 1/27/2020 Status: (Other)         Oz Atkinson  [address] [place] [address]             January 27, 2020         Dear Mr. Atkinson,    Below are the results from your recent visit:    Resulted Orders   HM2(CBC w/o Differential)   Result Value Ref Range    WBC 7.0 4.0 - 11.0 thou/uL    RBC 5.22 4.40 - 6.20 mill/uL    Hemoglobin 16.0 14.0 - 18.0 g/dL    Hematocrit 45.9 40.0 - 54.0 %    MCV 88 80 - 100 fL    MCH 30.7 27.0 - 34.0 pg    MCHC 34.8 32.0 - 36.0 g/dL    RDW 11.9 11.0 - 14.5 %    Platelets 274 140 - 440 thou/uL    MPV 8.5 7.0 - 10.0 fL   Comprehensive Metabolic Panel   Result Value Ref Range    Sodium 141 136 - 145 mmol/L    Potassium 4.1 3.5 - 5.0 mmol/L    Chloride 105 98 - 107 mmol/L    CO2 26 22 - 31 mmol/L    Anion Gap, Calculation 10 5 - 18 mmol/L    Glucose 99 70 - 125 mg/dL    BUN 15 8 - 22 mg/dL    Creatinine 1.27 0.70 - 1.30 mg/dL    GFR MDRD Af Amer >60 >60 mL/min/1.73m2    GFR MDRD Non Af Amer 59 (L) >60 mL/min/1.73m2    Bilirubin, Total 1.0 0.0 - 1.0 mg/dL    Calcium 9.0 8.5 - 10.5 mg/dL    Protein, Total 6.6 6.0 - 8.0 g/dL    Albumin 4.2 3.5 - 5.0 g/dL    Alkaline Phosphatase 85 45 - 120 U/L    AST 18 0 - 40 U/L    ALT 20 0 - 45 U/L    Narrative    Fasting Glucose reference range is 70-99 mg/dL per  American Diabetes Association (ADA) guidelines.   Lipid Cascade   Result Value Ref Range    Cholesterol 97 <=199 mg/dL    Triglycerides 88 <=149 mg/dL    HDL Cholesterol 36 (L) >=40 mg/dL    LDL Calculated 43 <=129 mg/dL    Patient Fasting > 8hrs? Yes    Thyroid Stimulating Hormone (TSH)   Result Value Ref Range    TSH 2.21 0.30 - 5.00 uIU/mL   Urinalysis-UC if Indicated   Result Value Ref Range    Color, UA Yellow Colorless, Yellow, Straw, Light Yellow    Clarity, UA Clear Clear    Glucose, UA Negative Negative    Bilirubin, UA Negative Negative    Ketones,  UA Negative Negative    Specific Gravity, UA 1.020 1.005 - 1.030    Blood, UA Negative Negative    pH, UA 6.0 5.0 - 8.0    Protein, UA Negative Negative mg/dL    Urobilinogen, UA 0.2 E.U./dL 0.2 E.U./dL, 1.0 E.U./dL    Nitrite, UA Negative Negative    Leukocytes, UA Negative Negative    Narrative    Microscopic not indicated  UC not indicated   PSA (Prostatic-Specific Antigen), Annual Screen   Result Value Ref Range    PSA 0.3 0.0 - 3.5 ng/mL    Narrative    Method is Abbott Prostate-Specific Antigen (PSA)  Standard-WHO 1st International (90:10)       All very good results    Please call with questions or contact us using [employer].    Sincerely,        Electronically signed by Carlos Ferreira MD

## 2021-06-21 NOTE — LETTER
Letter by Carlos Ferreira MD at      Author: Carlos Ferreira MD Service: -- Author Type: --    Filed:  Encounter Date: 2/1/2021 Status: (Other)         Oz Atkinson  [address] [place] [address]             February 1, 2021         Dear Mr. Atkinson,    Below are the results from your recent visit:    Resulted Orders   HM2(CBC w/o Differential)   Result Value Ref Range    WBC 6.0 4.0 - 11.0 thou/uL    RBC 4.89 4.40 - 6.20 mill/uL    Hemoglobin 14.6 14.0 - 18.0 g/dL    Hematocrit 42.7 40.0 - 54.0 %    MCV 87 80 - 100 fL    MCH 29.9 27.0 - 34.0 pg    MCHC 34.2 32.0 - 36.0 g/dL    RDW 12.2 11.0 - 14.5 %    Platelets 277 140 - 440 thou/uL    MPV 10.7 (H) 7.0 - 10.0 fL   Comprehensive Metabolic Panel   Result Value Ref Range    Sodium 140 136 - 145 mmol/L    Potassium 4.6 3.5 - 5.0 mmol/L    Chloride 104 98 - 107 mmol/L    CO2 26 22 - 31 mmol/L    Anion Gap, Calculation 10 5 - 18 mmol/L    Glucose 95 70 - 125 mg/dL    BUN 17 8 - 22 mg/dL    Creatinine 1.33 (H) 0.70 - 1.30 mg/dL    GFR MDRD Af Amer >60 >60 mL/min/1.73m2    GFR MDRD Non Af Amer 56 (L) >60 mL/min/1.73m2    Bilirubin, Total 0.8 0.0 - 1.0 mg/dL    Calcium 9.3 8.5 - 10.5 mg/dL    Protein, Total 6.5 6.0 - 8.0 g/dL    Albumin 4.1 3.5 - 5.0 g/dL    Alkaline Phosphatase 82 45 - 120 U/L    AST 16 0 - 40 U/L    ALT 21 0 - 45 U/L    Narrative    Fasting Glucose reference range is 70-99 mg/dL per  American Diabetes Association (ADA) guidelines.   Lipid Cascade   Result Value Ref Range    Cholesterol 81 <=199 mg/dL    Triglycerides 95 <=149 mg/dL    HDL Cholesterol 31 (L) >=40 mg/dL    LDL Calculated 31 <=129 mg/dL    Patient Fasting > 8hrs? Yes    Thyroid Stimulating Hormone (TSH)   Result Value Ref Range    TSH 2.63 0.30 - 5.00 uIU/mL   Urinalysis-UC if Indicated   Result Value Ref Range    Color, UA Yellow Colorless, Yellow, Straw, Light Yellow    Clarity, UA Clear Clear    Glucose, UA Negative Negative    Bilirubin, UA Negative Negative     Ketones, UA Negative Negative    Specific Gravity, UA 1.025 1.005 - 1.030    Blood, UA Negative Negative    pH, UA 6.5 5.0 - 8.0    Protein, UA Negative Negative mg/dL    Urobilinogen, UA 0.2 E.U./dL 0.2 E.U./dL, 1.0 E.U./dL    Nitrite, UA Negative Negative    Leukocytes, UA Negative Negative    Narrative    Microscopic not indicated  UC not indicated   PSA (Prostatic-Specific Antigen), Annual Screen   Result Value Ref Range    PSA 0.4 0.0 - 3.5 ng/mL    Narrative    Method is Abbott Prostate-Specific Antigen (PSA)  Standard-WHO 1st International (90:10)       All very good results and keeping BP normal and staying well hydrated will keep Kidney function preserved(mild elevation in serum Creatinine)    Please call with questions or contact us using [employer].    Sincerely,        Electronically signed by Carlos Ferreira MD

## 2021-06-23 NOTE — TELEPHONE ENCOUNTER
RN cannot approve Refill Request    RN can NOT refill this medication med is not covered by policy/route to provider     . Last office visit: Visit date not found Last Physical: Visit date not found Last MTM visit: Visit date not found Last visit same specialty: 1/3/2018 Nickolas Cary MD.  Next visit within 3 mo: Visit date not found  Next physical within 3 mo: Visit date not found      Betty Manuel, [facility] Triage/Med Refill 2/7/2019    Requested Prescriptions   Pending Prescriptions Disp Refills     ADVAIR DISKUS 250-50 mcg/dose DISKUS [Pharmacy Med Name: ADVAIR 250-50 DISKUS]  2     Sig: TAKE 1 PUFF BY MOUTH TWICE A DAY    There is no refill protocol information for this order

## 2021-06-23 NOTE — PROGRESS NOTES
Office Visit - Physical    Oz Atkinson   53 y.o. male    Date of Visit: 1/10/2019    Chief Complaint   Patient presents with     Annual Exam     Physical Exam   fasting       Subjective: Physical examination.    53-year-old  here for physical exam.  Prior history of coronary artery disease there is a small lump on the upper lip suggest dental exam plus dermatology.    Seen by cardiologist in August blood pressure was elevated and losartan was increased to 50 mg daily.  Dr. Rodriguez at [facility] is his cardiologist.    Flu shot given left deltoid.    Colonoscopy dated September 27, 2016 with Dr. Ivonne Sierra showed one isolated hyperplastic polyp.  Occasional twinge of chest pain with elevated blood pressure suggested reconsultation with Dr. Rodriguez cardiologist.    History of sleep apnea previously used a CPAP mask for 6 months not any longer.    Non-smoker    Light alcohol.    No known drug allergies.    ROS: A comprehensive review of systems was performed and was otherwise negative    Medications:   Prior to Admission medications    Medication Sig Start Date End Date Taking? Authorizing Provider   aspirin 81 mg chewable tablet 81 mg. 6/26/15  Yes PROVIDER, HISTORICAL   atorvastatin (LIPITOR) 80 MG tablet Take 80 mg by mouth. 6/26/15  Yes PROVIDER, HISTORICAL   fluticasone-salmeterol (ADVAIR DISKUS) 250-50 mcg/dose DISKUS Inhale 1 puff 2 (two) times a day. 4/26/18  Yes Loy Phillips MD   losartan (COZAAR) 50 MG tablet Take 50 mg by mouth. 8/1/18  Yes PROVIDER, HISTORICAL   losartan (COZAAR) 25 MG tablet Take 50 mg by mouth daily.        8/5/15 1/10/19  Carlos Ferreira MD       Allergies:No Known Allergies    Immunizations:   Immunization History   Administered Date(s) Administered     DT (pediatric) 01/01/1999     Influenza, seasonal,quad inj 36+ mos 10/07/2016     Influenza,seasonal quad, PF, 36+MOS 10/20/2017, 01/10/2019     Td,adult,historic,unspecified 06/11/2009     Tdap  2009       Health Maintenance: Immunizations reviewed and up-to-date.    Past Medical History: Hypertension with coronary artery disease and history of myocardial infarction in 2015 [facility].    Dr. Rodriguez cardiologist.  One stent has been deployed to the LAD.    Past Surgical History: Tonsillectomy and tympanoplasty's.    Family History: Mother  pancreatic cancer age 67.    Father  heart disease and uremia age 74.    3 children well.  Wife well breast cancer survivor.  Ductal carcinoma in situ.    Social History: Exercises regularly without chest pain unless he goes beyond 25 minutes of exercise.  Enjoys playing hockey as well.    .    Exam Chest clear to auscultation and percussion.  Heart tones regular rhythm without murmur rub or gallop.  Abdomen soft nontender no organomegaly.  No peritoneal signs.  Extremities free of edema cyanosis or clubbing.  Neck veins nondistended no thyromegaly or scleral icterus noted, carotids full.  Skin warm and dry easily conversant good spirited.  Normal intelligence.  Neurologically intact no gross localizing findings.  Skin negative lymph negative neuro negative psych normal HEENT negative back straight no severe spine tenderness genital rectal exam negative small prostate without nodularity good pulses noted in all 4 extremities no carotid bruits or thyromegaly.  Appears younger than stated age easily conversant highly intelligent.    Assessment and Plan  General medical examination in a 53-year-old male with known coronary artery disease and hypertension.  Offered HCTZ 12.5 mg in addition to the current losartan dose 50 mg daily for blood pressure control.  Patient has left-sided chest pain as well as lesion in the upper lip that appears benign suggested dental exam for that plus dermatology consult.  Also recommended consultation with the patient's cardiologist regarding atypical left sternal border chest pain and hypertension  systolic.    The following high BMI interventions were performed this visit: encouragement to exercise    Carlos Ferreira MD    Patient Active Problem List   Diagnosis     Sinus Bradycardia     Essential Hypertension     Adult Sleep Apnea     General medical exam

## 2021-06-25 NOTE — TELEPHONE ENCOUNTER
ADVAIR DISKUS 250-50 mcg/dose DISKUS 1 each 1 5/16/2021  No   Sig: TAKE 1 PUFF BY MOUTH TWICE A DAY   Sent to pharmacy as: Advair Diskus 250 mcg-50 mcg/dose powder for inhalation (fluticasone propion-salmeteroL)   E-Prescribing Status: Receipt confirmed by pharmacy (5/16/2021 10:34 AM CDT)     Patient requesting 3 month supply

## 2021-06-26 ENCOUNTER — HEALTH MAINTENANCE LETTER (OUTPATIENT)
Age: 56
End: 2021-06-26

## 2021-09-13 DIAGNOSIS — J44.9 COPD (CHRONIC OBSTRUCTIVE PULMONARY DISEASE) (H): ICD-10-CM

## 2021-09-13 DIAGNOSIS — I10 ESSENTIAL HYPERTENSION: Primary | ICD-10-CM

## 2021-09-13 NOTE — TELEPHONE ENCOUNTER
"Routing refill request to provider for review/approval because:  Drug interaction warning    Last Written Prescription Date:  5/28/21  Last Fill Quantity: 1,  # refills: 3   Last office visit provider:  1/29/21     Requested Prescriptions   Pending Prescriptions Disp Refills     ADVAIR DISKUS 250-50 MCG/DOSE inhaler [Pharmacy Med Name: ADVAIR 250-50 DISKUS]  1     Sig: INHALE 1 PUFF BY MOUTH TWICE A DAY       Long-Acting Beta Agonist Inhalers Protocol  Failed - 9/13/2021 12:25 AM        Failed - Order for Serevent, Striverdi, or Foradil and pt has steroid inhaler        Passed - Patient is age 12 or older        Passed - Recent (12 mo) or future (30 days) visit within the authorizing provider's specialty     Patient has had an office visit with the authorizing provider or a provider within the authorizing providers department within the previous 12 mos or has a future within next 30 days. See \"Patient Info\" tab in inbasket, or \"Choose Columns\" in Meds & Orders section of the refill encounter.              Passed - Medication is active on med list       Inhaled Steroids Protocol Passed - 9/13/2021 12:25 AM        Passed - Patient is age 12 or older        Passed - Recent (12 mo) or future (30 days) visit within the authorizing provider's specialty     Patient has had an office visit with the authorizing provider or a provider within the authorizing providers department within the previous 12 mos or has a future within next 30 days. See \"Patient Info\" tab in inbasket, or \"Choose Columns\" in Meds & Orders section of the refill encounter.              Passed - Medication is active on med list             Steffen Hunter RN 09/13/21 8:16 AM  "

## 2021-10-12 ENCOUNTER — TELEPHONE (OUTPATIENT)
Dept: INTERNAL MEDICINE | Facility: CLINIC | Age: 56
End: 2021-10-12

## 2021-10-12 NOTE — TELEPHONE ENCOUNTER
Patient calling this morning to report that he did a home test kit for covid 19 and it was negative.  No need for call back, this is provider FYI only per patient.

## 2021-10-16 ENCOUNTER — HEALTH MAINTENANCE LETTER (OUTPATIENT)
Age: 56
End: 2021-10-16

## 2021-11-29 ENCOUNTER — TRANSFERRED RECORDS (OUTPATIENT)
Dept: HEALTH INFORMATION MANAGEMENT | Facility: CLINIC | Age: 56
End: 2021-11-29
Payer: COMMERCIAL

## 2022-03-07 ENCOUNTER — TELEPHONE (OUTPATIENT)
Dept: INTERNAL MEDICINE | Facility: CLINIC | Age: 57
End: 2022-03-07

## 2022-04-26 ENCOUNTER — OFFICE VISIT (OUTPATIENT)
Dept: INTERNAL MEDICINE | Facility: CLINIC | Age: 57
End: 2022-04-26
Payer: COMMERCIAL

## 2022-04-26 VITALS
WEIGHT: 207.2 LBS | RESPIRATION RATE: 16 BRPM | TEMPERATURE: 98.1 F | HEART RATE: 56 BPM | HEIGHT: 71 IN | BODY MASS INDEX: 29.01 KG/M2 | DIASTOLIC BLOOD PRESSURE: 78 MMHG | SYSTOLIC BLOOD PRESSURE: 130 MMHG | OXYGEN SATURATION: 98 %

## 2022-04-26 DIAGNOSIS — N18.31 CHRONIC KIDNEY DISEASE, STAGE 3A (H): ICD-10-CM

## 2022-04-26 DIAGNOSIS — Z00.00 ROUTINE GENERAL MEDICAL EXAMINATION AT A HEALTH CARE FACILITY: Primary | ICD-10-CM

## 2022-04-26 LAB
ALBUMIN SERPL-MCNC: 4.1 G/DL (ref 3.5–5)
ALBUMIN UR-MCNC: NEGATIVE MG/DL
ALP SERPL-CCNC: 84 U/L (ref 45–120)
ALT SERPL W P-5'-P-CCNC: 27 U/L (ref 0–45)
ANION GAP SERPL CALCULATED.3IONS-SCNC: 12 MMOL/L (ref 5–18)
APPEARANCE UR: CLEAR
AST SERPL W P-5'-P-CCNC: 19 U/L (ref 0–40)
BILIRUB SERPL-MCNC: 0.5 MG/DL (ref 0–1)
BILIRUB UR QL STRIP: NEGATIVE
BUN SERPL-MCNC: 17 MG/DL (ref 8–22)
CALCIUM SERPL-MCNC: 9.1 MG/DL (ref 8.5–10.5)
CHLORIDE BLD-SCNC: 105 MMOL/L (ref 98–107)
CHOLEST SERPL-MCNC: 91 MG/DL
CO2 SERPL-SCNC: 26 MMOL/L (ref 22–31)
COLOR UR AUTO: YELLOW
CREAT SERPL-MCNC: 1.32 MG/DL (ref 0.7–1.3)
ERYTHROCYTE [DISTWIDTH] IN BLOOD BY AUTOMATED COUNT: 12.1 % (ref 10–15)
FASTING STATUS PATIENT QL REPORTED: YES
GFR SERPL CREATININE-BSD FRML MDRD: 63 ML/MIN/1.73M2
GLUCOSE BLD-MCNC: 102 MG/DL (ref 70–125)
GLUCOSE UR STRIP-MCNC: NEGATIVE MG/DL
HCT VFR BLD AUTO: 41.5 % (ref 40–53)
HDLC SERPL-MCNC: 30 MG/DL
HGB BLD-MCNC: 14.5 G/DL (ref 13.3–17.7)
HGB UR QL STRIP: NEGATIVE
KETONES UR STRIP-MCNC: NEGATIVE MG/DL
LDLC SERPL CALC-MCNC: 40 MG/DL
LEUKOCYTE ESTERASE UR QL STRIP: NEGATIVE
MCH RBC QN AUTO: 30 PG (ref 26.5–33)
MCHC RBC AUTO-ENTMCNC: 34.9 G/DL (ref 31.5–36.5)
MCV RBC AUTO: 86 FL (ref 78–100)
NITRATE UR QL: NEGATIVE
PH UR STRIP: 6 [PH] (ref 5–8)
PLATELET # BLD AUTO: 264 10E3/UL (ref 150–450)
POTASSIUM BLD-SCNC: 4.6 MMOL/L (ref 3.5–5)
PROT SERPL-MCNC: 6.7 G/DL (ref 6–8)
PSA SERPL-MCNC: 0.44 UG/L (ref 0–3.5)
RBC # BLD AUTO: 4.84 10E6/UL (ref 4.4–5.9)
SODIUM SERPL-SCNC: 143 MMOL/L (ref 136–145)
SP GR UR STRIP: 1.02 (ref 1–1.03)
TRIGL SERPL-MCNC: 107 MG/DL
TSH SERPL DL<=0.005 MIU/L-ACNC: 3.07 UIU/ML (ref 0.3–5)
UROBILINOGEN UR STRIP-ACNC: 0.2 E.U./DL
WBC # BLD AUTO: 7.1 10E3/UL (ref 4–11)

## 2022-04-26 PROCEDURE — 81003 URINALYSIS AUTO W/O SCOPE: CPT | Performed by: INTERNAL MEDICINE

## 2022-04-26 PROCEDURE — G0103 PSA SCREENING: HCPCS | Performed by: INTERNAL MEDICINE

## 2022-04-26 PROCEDURE — 80050 GENERAL HEALTH PANEL: CPT | Performed by: INTERNAL MEDICINE

## 2022-04-26 PROCEDURE — 80061 LIPID PANEL: CPT | Performed by: INTERNAL MEDICINE

## 2022-04-26 PROCEDURE — 99396 PREV VISIT EST AGE 40-64: CPT | Performed by: INTERNAL MEDICINE

## 2022-04-26 PROCEDURE — 36415 COLL VENOUS BLD VENIPUNCTURE: CPT | Performed by: INTERNAL MEDICINE

## 2022-04-26 RX ORDER — LOSARTAN POTASSIUM 100 MG/1
100 TABLET ORAL DAILY
COMMUNITY
Start: 2022-03-03 | End: 2024-08-01

## 2022-04-26 NOTE — PROGRESS NOTES
Office Visit - Physical    Oz Atkinson   57 year old male    Date of Visit: 2022    Chief Complaint   Patient presents with     Physical     Hit head on tree branch- saw ENT, had CT- no broken nose but found stenosis in cervical spine (CDI); deviated septum       Subjective: Physical examination for this 57-year-old  at [employer] of [employer] graduate.  Father  3 history of tympanoplasty right side with no impairment in hearing also recent concussion 2021 ENT consult some cervical spinal stenosis was noted with foraminal stenosis.  Reassured.    History of coronary artery disease and prior history of myocardial infarction 1 stent previously deployed LAD followed closely by cardiology.  Recent stress test negative for exercise-induced ischemia.  Asymptomatic without chest pain shortness of breath exertional syncope or palpitations.    ROS: A comprehensive review of systems was performed and was otherwise negative    Medications:   Prior to Admission medications    Medication Sig Start Date End Date Taking? Authorizing Provider   ADVAIR DISKUS 250-50 MCG/DOSE inhaler INHALE 1 PUFF BY MOUTH TWICE A DAY 21  Yes Carlos Ferreira MD   aspirin 81 mg chewable tablet [ASPIRIN 81 MG CHEWABLE TABLET] 81 mg. 6/26/15  Yes Provider, Historical   atorvastatin (LIPITOR) 80 MG tablet [ATORVASTATIN (LIPITOR) 80 MG TABLET] TAKE 1 TABLET BY MOUTH EVERYDAY AT BEDTIME 10/17/20  Yes Carlos Ferreira MD   losartan (COZAAR) 100 MG tablet Take 100 mg by mouth daily 3/3/22  Yes Reported, Patient       Allergies:No Known Allergies    Immunizations:   Immunization History   Administered Date(s) Administered     COVID-19,PF,Moderna 2021, 04/10/2021, 2021     DT (PEDS <7y) 1999     Influenza Quad, Recombinant, pf(RIV4) (Flublok) 2021     Influenza Vaccine IM > 6 months Valent IIV4 (Alfuria,Fluzone) 10/20/2017, 01/10/2019, 2019, 2019      Influenza Vaccine, 6+MO IM (QUADRIVALENT W/PRESERVATIVES) 10/07/2016     Td,adult,historic,unspecified 2009     Tdap (Adacel,Boostrix) 2009       Health Maintenance: Immunizations reviewed he has received the COVID-vaccine 3 vaccines with 1 booster he was advised to get the fourth vaccine and second booster.  His other past medical history was reviewed and immunizations otherwise all up-to-date.  Previously the patient was advised to see Dr. Addison Sharpe [place] [facility] for excessive phlegm and cough.    Non-smoker.    Social alcohol no known drug allergies.    Last colonoscopy dated 2016 showed 1 polyp otherwise allCLEAR Dr. Ivonne Sierra colorectal surgery group presiding.    Past Medical History: Prior history of inferoapical myocardial infarction status post LAD stent deployed followed regularly by cardiology.    Past Surgical History: No anesthetic complications from any prior surgery.    Family History: Mother  age 67 pancreatic cancer.    Father  uremia diabetes in his early 70s.  3 children well wife well.    Social History:  and enjoys doing exercise still plays hockey.    Exam skin negative lymph negative neuro negative psych normal HEENT negative tympanoplasty scar right side no change in hearing back straight no severe spine tenderness lungs clear heart tones normal no carotid bruits abdomen benign no extra adipose tissue no carotid bruits no thyromegaly no lymphadenopathy appreciated.  Areas genital rectal exam negative testicular exam normal no groin hernias rectal showed a small prostate without nodularity induration rest of the examination was unremarkable lower extremities are free of edema cyanosis or clubbing good pulse noted in all 4 extremities and again no carotid bruits.    Assessment and Plan  General medical examination at health care facility today check hemogram comprehensive metabolic profile plus urinalysis lipid panel PSA TSH.   Encourage close contact with cardiology especially regarding prior history of myocardial infarction involving the inferoapical wall with LAD stent placed.  Recent stress test reported by patient has been negative for exercise-induced myocardial ischemia.  Remains asymptomatic without chest pain shortness of breath exertional syncope or palpitations.    Today we will check hemogram comprehensive metabolic profile urinalysis lipid panel PSA TSH.    Carlos Ferreira MD    Patient Active Problem List   Diagnosis     Sinus Bradycardia     Essential Hypertension     Adult Sleep Apnea     General medical exam     Chronic kidney disease, stage 3a (H)     Answers for HPI/ROS submitted by the patient on 4/25/2022  Frequency of exercise:: 2-3 days/week  Getting at least 3 servings of Calcium per day:: Yes  Diet:: Regular (no restrictions)  Taking medications regularly:: Yes  Medication side effects:: None  Bi-annual eye exam:: Yes  Dental care twice a year:: Yes  Sleep apnea or symptoms of sleep apnea:: Daytime drowsiness, Sleep apnea  Additional concerns today:: No  Duration of exercise:: 15-30 minutes

## 2022-04-26 NOTE — LETTER
April 26, 2022      Elias Atkinson  [address] [place] [address]        Dear ,    We are writing to inform you of your test results.    All very good, but for minimal elevation in serum creatinine a measure of kidney function.     The best way to preserve kidney function is to stay well-hydrated and to keep blood pressure normal and to avoid over-the-counter nonsteroidal anti-inflammatory drugs like ibuprofen and Naprosyn. Tylenol is preferred.       Resulted Orders   CBC with platelets   Result Value Ref Range    WBC Count 7.1 4.0 - 11.0 10e3/uL    RBC Count 4.84 4.40 - 5.90 10e6/uL    Hemoglobin 14.5 13.3 - 17.7 g/dL    Hematocrit 41.5 40.0 - 53.0 %    MCV 86 78 - 100 fL    MCH 30.0 26.5 - 33.0 pg    MCHC 34.9 31.5 - 36.5 g/dL    RDW 12.1 10.0 - 15.0 %    Platelet Count 264 150 - 450 10e3/uL   Comprehensive metabolic panel   Result Value Ref Range    Sodium 143 136 - 145 mmol/L    Potassium 4.6 3.5 - 5.0 mmol/L    Chloride 105 98 - 107 mmol/L    Carbon Dioxide (CO2) 26 22 - 31 mmol/L    Anion Gap 12 5 - 18 mmol/L    Urea Nitrogen 17 8 - 22 mg/dL    Creatinine 1.32 (H) 0.70 - 1.30 mg/dL    Calcium 9.1 8.5 - 10.5 mg/dL    Glucose 102 70 - 125 mg/dL    Alkaline Phosphatase 84 45 - 120 U/L    AST 19 0 - 40 U/L    ALT 27 0 - 45 U/L    Protein Total 6.7 6.0 - 8.0 g/dL    Albumin 4.1 3.5 - 5.0 g/dL    Bilirubin Total 0.5 0.0 - 1.0 mg/dL    GFR Estimate 63 >60 mL/min/1.73m2      Comment:      Effective December 21, 2021 eGFRcr in adults is calculated using the 2021 CKD-EPI creatinine equation which includes age and gender (Abner et al., NEJ, DOI: 10.1056/LPSCkw1210097)   TSH   Result Value Ref Range    TSH 3.07 0.30 - 5.00 uIU/mL   Prostate Specific Antigen Screen   Result Value Ref Range    Prostate Specific Antigen Screen 0.44 0.00 - 3.50 ug/L    Narrative    Assay Method is Abbott Prostate-Specific Antigen (PSA)  Standard-WHO 1st International (90:10)   Lipid panel reflex to direct LDL Fasting    Result Value Ref Range    Cholesterol 91 <=199 mg/dL    Triglycerides 107 <=149 mg/dL    Direct Measure HDL 30 (L) >=40 mg/dL      Comment:      HDL Cholesterol Reference Range:     0-2 years:   No reference ranges established for patients under 2 years old  at [facility] for lipid analytes.    2-8 years:  Greater than 45 mg/dL     18 years and older:   Female: Greater than or equal to 50 mg/dL   Male:   Greater than or equal to 40 mg/dL    LDL Cholesterol Calculated 40 <=129 mg/dL    Patient Fasting > 8hrs? Yes    UA reflex to Microscopic and Culture   Result Value Ref Range    Color Urine Yellow Colorless, Straw, Light Yellow, Yellow    Appearance Urine Clear Clear    Glucose Urine Negative Negative mg/dL    Bilirubin Urine Negative Negative    Ketones Urine Negative Negative mg/dL    Specific Gravity Urine 1.025 1.005 - 1.030    Blood Urine Negative Negative    pH Urine 6.0 5.0 - 8.0    Protein Albumin Urine Negative Negative mg/dL    Urobilinogen Urine 0.2 0.2, 1.0 E.U./dL    Nitrite Urine Negative Negative    Leukocyte Esterase Urine Negative Negative    Narrative    Microscopic not indicated       If you have any questions or concerns, please call the clinic at the number listed above.       Sincerely,      Carlos Ferreira MD

## 2022-05-10 ENCOUNTER — NURSE TRIAGE (OUTPATIENT)
Dept: NURSING | Facility: CLINIC | Age: 57
End: 2022-05-10
Payer: COMMERCIAL

## 2022-05-10 ENCOUNTER — VIRTUAL VISIT (OUTPATIENT)
Dept: INTERNAL MEDICINE | Facility: CLINIC | Age: 57
End: 2022-05-10
Payer: COMMERCIAL

## 2022-05-10 DIAGNOSIS — U07.1 INFECTION DUE TO 2019 NOVEL CORONAVIRUS: Primary | ICD-10-CM

## 2022-05-10 PROCEDURE — 99213 OFFICE O/P EST LOW 20 MIN: CPT | Mod: GT | Performed by: INTERNAL MEDICINE

## 2022-05-10 NOTE — PROGRESS NOTES
Elias is a 57 year old who is being evaluated via a billable video visit.      How would you like to obtain your AVS? MyChart  If the video visit is dropped, the invitation should be resent by: Text to cell phone: [phone]  Will anyone else be joining your video visit? No        Subjective   Elias is a 57 year old who presents for the following health issues    Telephone visit because of COVID-19 infection, breakthrough case, despite being vaccinated and boosted with Moderna November 23, 2021    Symptoms started on [place] May 8, 2022, initially with a scratchy throat, followed the next morning by headache and low-grade fever 99.7 Fahrenheit and also a slight cough.  Wife has similar symptoms.  He tested positive with a Abbott Binax home COVID test.    He does have pre-existing medical conditions in terms of coronary disease, history of MI and angioplasty, hyperlipidemia, hypertension, and asthma that is well controlled on maintenance Advair inhaler.    At home his breathing feels okay, and he does not feel particularly sick.  He works as an  at [employer].    I told him that starting on Paxlovid d right now would be optional, since his symptoms are pretty mild.  Today May 10, 2022 would be day 3, and where he did decide to use Paxlovid, he would need to start it by Thursday, May 12.  He will let me know if he changes his mind wants to go on medication.  He would interrupt his atorvastatin.    I suggested he get a home pulse oximeter, and notify us or seek medical attention probably if oxygen saturations drop below 90%.    He tell me that he is traveling to [place] with his wife on June 11, returning June 19.  I told him he will need a letter stating dates of COVID-19 infection, and that he would no longer be considered contagious as long as he is completely symptomatically recovered, and should not be considered contagious, and that he should not be subjected to additional COVID testing because  that could be positive because of actual infection history.    He asked about boosters, and I told him that since he is actually having an infection, his immune system is getting boosted.  Suggested he perhaps wait until the next generation of COVID-vaccine come out later this year      Immunization History   Administered Date(s) Administered     COVID-19,PF,Moderna 03/13/2021, 04/10/2021, 11/23/2021     DT (PEDS <7y) 01/01/1999     Influenza Quad, Recombinant, pf(RIV4) (Flublok) 01/29/2021     Influenza Vaccine IM > 6 months Valent IIV4 (Alfuria,Fluzone) 10/20/2017, 01/10/2019, 11/13/2019, 11/16/2019     Influenza Vaccine, 6+MO IM (QUADRIVALENT W/PRESERVATIVES) 10/07/2016     Td,adult,historic,unspecified 06/11/2009     Tdap (Adacel,Boostrix) 06/11/2009       Telephone visit 13 minutes

## 2022-05-10 NOTE — TELEPHONE ENCOUNTER
Triage Call:     Pt tested positive for COVID-19 and is asking about treatment options    Sx:  Temp: 99.7F  Cough  Sore throat  Stuffy Nose  Mild lightheadednes; Hydrated  Headache; tylenol    Hx of Asthma; twice a day    Pt is suppose to go to [place] June 11th and is asking for a letter from a provider for returning back to the [place]     Disposition: Virtual Appt. Pt was given care advice and agreed to plan of having a virtual visit to discuss his treatment options with a provider. Pt will also ask for a letter for travel.     How can I protect others?    These guidelines are for isolating before returning to work, school or .       If you DO have symptoms:  o Stay home and away from others  - For at least 5 days after your symptoms started, AND   - You are fever free for 24 hours (with no medicine that reduces fever), AND  - Your other symptoms are better.  o Wear a mask for 10 full days any time you are around others.    If you DON'T have symptoms:  o Stay at home and away from others for at least 5 days after your positive test.  o Wear a mask for 10 full days any time you are around others.    Reason for Disposition    HIGH RISK for severe COVID complications (e.g., weak immune system, age > 64 years, obesity with BMI > 25, pregnant, chronic lung disease or other chronic medical condition) (Exception: Already seen by PCP and no new or worsening symptoms.)    Additional Information    Negative: SEVERE difficulty breathing (e.g., struggling for each breath, speaks in single words)    Negative: Difficult to awaken or acting confused (e.g., disoriented, slurred speech)    Negative: Bluish (or gray) lips or face now    Negative: Shock suspected (e.g., cold/pale/clammy skin, too weak to stand, low BP, rapid pulse)    Negative: Sounds like a life-threatening emergency to the triager    Negative: [1] Diagnosed or suspected COVID-19 AND [2] symptoms lasting 3 or more weeks    Negative: [1] COVID-19 exposure AND  [2] no symptoms    Negative: COVID-19 vaccine reaction suspected (e.g., fever, headache, muscle aches) occurring 1 to 3 days after getting vaccine    Negative: COVID-19 vaccine, questions about    Negative: [1] Lives with someone known to have influenza (flu test positive) AND [2] flu-like symptoms (e.g., cough, runny nose, sore throat, SOB; with or without fever)    Negative: [1] Adult with possible COVID-19 symptoms AND [2] triager concerned about severity of symptoms or other causes    Negative: COVID-19 and breastfeeding, questions about    Negative: SEVERE or constant chest pain or pressure  (Exception: Mild central chest pain, present only when coughing.)    Negative: MODERATE difficulty breathing (e.g., speaks in phrases, SOB even at rest, pulse 100-120)    Negative: Headache and stiff neck (can't touch chin to chest)    Negative: Oxygen level (e.g., pulse oximetry) 90 percent or lower    Negative: Chest pain or pressure    Negative: Patient sounds very sick or weak to the triager    Negative: MILD difficulty breathing (e.g., minimal/no SOB at rest, SOB with walking, pulse <100)    Negative: Fever > 103 F (39.4 C)    Negative: [1] Fever > 101 F (38.3 C) AND [2] over 60 years of age    Negative: [1] Fever > 100.0 F (37.8 C) AND [2] bedridden (e.g., nursing home patient, CVA, chronic illness, recovering from surgery)    Protocols used: CORONAVIRUS (COVID-19) DIAGNOSED OR [id number] 1.18.2022    Mony Jackson RN  [facility] Nurse Advisor 8:31 AM 5/10/2022

## 2022-05-26 ENCOUNTER — MYC MEDICAL ADVICE (OUTPATIENT)
Dept: INTERNAL MEDICINE | Facility: CLINIC | Age: 57
End: 2022-05-26
Payer: COMMERCIAL

## 2022-07-22 ENCOUNTER — TRANSFERRED RECORDS (OUTPATIENT)
Dept: HEALTH INFORMATION MANAGEMENT | Facility: CLINIC | Age: 57
End: 2022-07-22

## 2022-07-23 ENCOUNTER — HEALTH MAINTENANCE LETTER (OUTPATIENT)
Age: 57
End: 2022-07-23

## 2022-10-01 ENCOUNTER — HEALTH MAINTENANCE LETTER (OUTPATIENT)
Age: 57
End: 2022-10-01

## 2022-10-17 DIAGNOSIS — J44.9 COPD (CHRONIC OBSTRUCTIVE PULMONARY DISEASE) (H): Primary | ICD-10-CM

## 2022-10-17 DIAGNOSIS — I10 ESSENTIAL HYPERTENSION: ICD-10-CM

## 2022-10-17 NOTE — TELEPHONE ENCOUNTER
Patient calling to get a medication refill on medications attached.      Patient stated he is pretty low

## 2022-10-18 RX ORDER — FLUTICASONE PROPIONATE AND SALMETEROL 250; 50 UG/1; UG/1
1 POWDER RESPIRATORY (INHALATION) 2 TIMES DAILY
Qty: 1 EACH | Refills: 5 | Status: SHIPPED | OUTPATIENT
Start: 2022-10-18 | End: 2023-04-09

## 2022-10-23 ENCOUNTER — MYC MEDICAL ADVICE (OUTPATIENT)
Dept: INTERNAL MEDICINE | Facility: CLINIC | Age: 57
End: 2022-10-23

## 2023-02-07 ENCOUNTER — TELEPHONE (OUTPATIENT)
Dept: INTERNAL MEDICINE | Facility: CLINIC | Age: 58
End: 2023-02-07
Payer: COMMERCIAL

## 2023-02-07 DIAGNOSIS — M25.519 SHOULDER PAIN: ICD-10-CM

## 2023-02-07 DIAGNOSIS — M19.019 SHOULDER ARTHRITIS: Primary | ICD-10-CM

## 2023-02-07 NOTE — TELEPHONE ENCOUNTER
Patient calling to get a referral for [facility].    Patient will call in the time he waits to see if he needs one from there. If patient does please add.    patient hurt shoulder      [phone] (Mobile)

## 2023-02-07 NOTE — TELEPHONE ENCOUNTER
Carlos Ferreira MD [facility] Care Team Pool 1 hour ago (3:17 PM)     MB  Ok for [facility] consult re shoulder pain and please enter order and call patient and I'll cosign and dx is shoulder pain and thanks! CHIDI

## 2023-03-01 ENCOUNTER — TRANSFERRED RECORDS (OUTPATIENT)
Dept: HEALTH INFORMATION MANAGEMENT | Facility: CLINIC | Age: 58
End: 2023-03-01

## 2023-03-30 ENCOUNTER — TELEPHONE (OUTPATIENT)
Dept: INTERNAL MEDICINE | Facility: CLINIC | Age: 58
End: 2023-03-30
Payer: COMMERCIAL

## 2023-04-09 DIAGNOSIS — J44.9 COPD (CHRONIC OBSTRUCTIVE PULMONARY DISEASE) (H): ICD-10-CM

## 2023-04-09 RX ORDER — FLUTICASONE PROPIONATE AND SALMETEROL 50; 250 UG/1; UG/1
POWDER RESPIRATORY (INHALATION)
Qty: 1 EACH | Refills: 1 | Status: SHIPPED | OUTPATIENT
Start: 2023-04-09 | End: 2023-06-07

## 2023-04-09 NOTE — TELEPHONE ENCOUNTER
"Last Written Prescription Date:  10/18/22  Last Fill Quantity: 1 each,  # refills: 5   Last office visit provider:  4/26/22, PCP     Requested Prescriptions   Pending Prescriptions Disp Refills     ADVAIR DISKUS 250-50 MCG/ACT inhaler [Pharmacy Med Name: ADVAIR 250-50 DISKUS]  1     Sig: TAKE 1 PUFF BY MOUTH TWICE A DAY       Inhaled Steroids Protocol Passed - 4/9/2023  3:30 PM        Passed - Patient is age 12 or older        Passed - Recent (12 mo) or future (30 days) visit within the authorizing provider's specialty     Patient has had an office visit with the authorizing provider or a provider within the authorizing providers department within the previous 12 mos or has a future within next 30 days. See \"Patient Info\" tab in inbasket, or \"Choose Columns\" in Meds & Orders section of the refill encounter.              Passed - Medication is active on med list       Long-Acting Beta Agonist Inhalers Protocol  Passed - 4/9/2023  3:30 PM        Passed - Patient is age 12 or older        Passed - Recent (12 mo) or future (30 days) visit within the authorizing provider's specialty     Patient has had an office visit with the authorizing provider or a provider within the authorizing providers department within the previous 12 mos or has a future within next 30 days. See \"Patient Info\" tab in inbasket, or \"Choose Columns\" in Meds & Orders section of the refill encounter.              Passed - Medication is active on med list             Laura Singer RN 04/09/23 3:49 PM  "

## 2023-06-07 DIAGNOSIS — J44.9 COPD (CHRONIC OBSTRUCTIVE PULMONARY DISEASE) (H): ICD-10-CM

## 2023-06-07 RX ORDER — FLUTICASONE PROPIONATE AND SALMETEROL 50; 250 UG/1; UG/1
POWDER RESPIRATORY (INHALATION)
Qty: 1 EACH | Refills: 11 | Status: SHIPPED | OUTPATIENT
Start: 2023-06-07 | End: 2023-06-29

## 2023-06-07 NOTE — TELEPHONE ENCOUNTER
"Routing refill request to provider for review/approval because:  Patient needs to be seen because it has been more than 1 year since last office visit.  Dx code needed    Last Written Prescription Date:  4/9/23  Last Fill Quantity: 1,  # refills: 1   Last office visit provider:   5/10/22    Requested Prescriptions   Pending Prescriptions Disp Refills     ADVAIR DISKUS 250-50 MCG/ACT inhaler [Pharmacy Med Name: ADVAIR 250-50 DISKUS]  1     Sig: INHALE 1 PUFF BY MOUTH TWICE A DAY       Inhaled Steroids Protocol Failed - 6/7/2023  2:39 AM        Failed - Recent (12 mo) or future (30 days) visit within the authorizing provider's specialty     Patient has had an office visit with the authorizing provider or a provider within the authorizing providers department within the previous 12 mos or has a future within next 30 days. See \"Patient Info\" tab in inbasket, or \"Choose Columns\" in Meds & Orders section of the refill encounter.              Passed - Patient is age 12 or older        Passed - Medication is active on med list       Long-Acting Beta Agonist Inhalers Protocol  Failed - 6/7/2023  2:39 AM        Failed - Recent (12 mo) or future (30 days) visit within the authorizing provider's specialty     Patient has had an office visit with the authorizing provider or a provider within the authorizing providers department within the previous 12 mos or has a future within next 30 days. See \"Patient Info\" tab in inbasket, or \"Choose Columns\" in Meds & Orders section of the refill encounter.              Passed - Patient is age 12 or older        Passed - Medication is active on med list             Eve Fuller RN 06/07/23 2:18 PM  "

## 2023-06-28 ASSESSMENT — ENCOUNTER SYMPTOMS
MYALGIAS: 0
ABDOMINAL PAIN: 0
FEVER: 0
WEAKNESS: 0
ARTHRALGIAS: 0
SORE THROAT: 0
CHILLS: 0
DYSURIA: 0
FREQUENCY: 0
JOINT SWELLING: 0
CONSTIPATION: 0
NAUSEA: 0
DIARRHEA: 0
SHORTNESS OF BREATH: 0
EYE PAIN: 0
HEADACHES: 0
PARESTHESIAS: 0
NERVOUS/ANXIOUS: 0
DIZZINESS: 0
HEMATOCHEZIA: 0
HEMATURIA: 0
PALPITATIONS: 0
COUGH: 0
HEARTBURN: 0

## 2023-06-29 ENCOUNTER — OFFICE VISIT (OUTPATIENT)
Dept: INTERNAL MEDICINE | Facility: CLINIC | Age: 58
End: 2023-06-29
Payer: COMMERCIAL

## 2023-06-29 VITALS
OXYGEN SATURATION: 99 % | HEIGHT: 71 IN | WEIGHT: 204.8 LBS | DIASTOLIC BLOOD PRESSURE: 86 MMHG | HEART RATE: 55 BPM | RESPIRATION RATE: 16 BRPM | SYSTOLIC BLOOD PRESSURE: 140 MMHG | BODY MASS INDEX: 28.67 KG/M2

## 2023-06-29 DIAGNOSIS — Z00.00 ROUTINE GENERAL MEDICAL EXAMINATION AT A HEALTH CARE FACILITY: Primary | ICD-10-CM

## 2023-06-29 DIAGNOSIS — N18.31 CHRONIC KIDNEY DISEASE, STAGE 3A (H): ICD-10-CM

## 2023-06-29 LAB
ALBUMIN SERPL BCG-MCNC: 4.5 G/DL (ref 3.5–5.2)
ALBUMIN UR-MCNC: NEGATIVE MG/DL
ALP SERPL-CCNC: 90 U/L (ref 40–129)
ALT SERPL W P-5'-P-CCNC: 23 U/L (ref 0–70)
ANION GAP SERPL CALCULATED.3IONS-SCNC: 11 MMOL/L (ref 7–15)
APPEARANCE UR: CLEAR
AST SERPL W P-5'-P-CCNC: 23 U/L (ref 0–45)
BILIRUB SERPL-MCNC: 0.5 MG/DL
BILIRUB UR QL STRIP: NEGATIVE
BUN SERPL-MCNC: 20.6 MG/DL (ref 6–20)
CALCIUM SERPL-MCNC: 9.1 MG/DL (ref 8.6–10)
CHLORIDE SERPL-SCNC: 107 MMOL/L (ref 98–107)
CHOLEST SERPL-MCNC: 89 MG/DL
COLOR UR AUTO: YELLOW
CREAT SERPL-MCNC: 1.45 MG/DL (ref 0.67–1.17)
DEPRECATED HCO3 PLAS-SCNC: 26 MMOL/L (ref 22–29)
ERYTHROCYTE [DISTWIDTH] IN BLOOD BY AUTOMATED COUNT: 12.4 % (ref 10–15)
GFR SERPL CREATININE-BSD FRML MDRD: 56 ML/MIN/1.73M2
GLUCOSE SERPL-MCNC: 107 MG/DL (ref 70–99)
GLUCOSE UR STRIP-MCNC: NEGATIVE MG/DL
HCT VFR BLD AUTO: 40.9 % (ref 40–53)
HDLC SERPL-MCNC: 35 MG/DL
HGB BLD-MCNC: 14.6 G/DL (ref 13.3–17.7)
HGB UR QL STRIP: NEGATIVE
KETONES UR STRIP-MCNC: NEGATIVE MG/DL
LDLC SERPL CALC-MCNC: 41 MG/DL
LEUKOCYTE ESTERASE UR QL STRIP: NEGATIVE
MCH RBC QN AUTO: 30.6 PG (ref 26.5–33)
MCHC RBC AUTO-ENTMCNC: 35.7 G/DL (ref 31.5–36.5)
MCV RBC AUTO: 86 FL (ref 78–100)
NITRATE UR QL: NEGATIVE
NONHDLC SERPL-MCNC: 54 MG/DL
PH UR STRIP: 5.5 [PH] (ref 5–8)
PLATELET # BLD AUTO: 297 10E3/UL (ref 150–450)
POTASSIUM SERPL-SCNC: 3.9 MMOL/L (ref 3.4–5.3)
PROT SERPL-MCNC: 6.9 G/DL (ref 6.4–8.3)
PSA SERPL DL<=0.01 NG/ML-MCNC: 0.71 NG/ML (ref 0–3.5)
RBC # BLD AUTO: 4.77 10E6/UL (ref 4.4–5.9)
SODIUM SERPL-SCNC: 144 MMOL/L (ref 136–145)
SP GR UR STRIP: 1.02 (ref 1–1.03)
TRIGL SERPL-MCNC: 66 MG/DL
TSH SERPL DL<=0.005 MIU/L-ACNC: 3.48 UIU/ML (ref 0.3–4.2)
UROBILINOGEN UR STRIP-ACNC: 0.2 E.U./DL
WBC # BLD AUTO: 7.1 10E3/UL (ref 4–11)

## 2023-06-29 PROCEDURE — G0103 PSA SCREENING: HCPCS | Performed by: INTERNAL MEDICINE

## 2023-06-29 PROCEDURE — 80053 COMPREHEN METABOLIC PANEL: CPT | Performed by: INTERNAL MEDICINE

## 2023-06-29 PROCEDURE — 84443 ASSAY THYROID STIM HORMONE: CPT | Performed by: INTERNAL MEDICINE

## 2023-06-29 PROCEDURE — 36415 COLL VENOUS BLD VENIPUNCTURE: CPT | Performed by: INTERNAL MEDICINE

## 2023-06-29 PROCEDURE — 80061 LIPID PANEL: CPT | Performed by: INTERNAL MEDICINE

## 2023-06-29 PROCEDURE — 81003 URINALYSIS AUTO W/O SCOPE: CPT | Performed by: INTERNAL MEDICINE

## 2023-06-29 PROCEDURE — 99396 PREV VISIT EST AGE 40-64: CPT | Performed by: INTERNAL MEDICINE

## 2023-06-29 PROCEDURE — 85027 COMPLETE CBC AUTOMATED: CPT | Performed by: INTERNAL MEDICINE

## 2023-06-29 RX ORDER — FLUTICASONE PROPIONATE AND SALMETEROL 250; 50 UG/1; UG/1
1 POWDER RESPIRATORY (INHALATION) 2 TIMES DAILY
Qty: 1 EACH | Refills: 11 | Status: SHIPPED | OUTPATIENT
Start: 2023-06-29 | End: 2024-07-15

## 2023-06-29 ASSESSMENT — PAIN SCALES - GENERAL: PAINLEVEL: NO PAIN (0)

## 2023-06-29 NOTE — PROGRESS NOTES
Office Visit - Physical    Oz Atkinson   58 year old male    Date of Visit: 6/29/2023    Chief Complaint   Patient presents with     Physical       Subjective: Physical examination.    58-year-old  here for physical exam.  Non-smoker.  Chronic kidney disease listed on his problem list.  With mild serum creatinine elevation discussed in detail.  Stable.  Normalization of blood pressure staying well-hydrated and avoiding nonsteroidal anti-inflammatory drugs beneficial.    Non-smoker uses alcohol not daily but social no known drug allergies.  Discussed vaccines.  Colonoscopy at age 50 [facility] suggest repeat age 60.    ROS: A comprehensive review of systems was performed and was otherwise negative    Medications:   Prior to Admission medications    Medication Sig Start Date End Date Taking? Authorizing Provider   aspirin 81 mg chewable tablet [ASPIRIN 81 MG CHEWABLE TABLET] 81 mg. 6/26/15  Yes Provider, Historical   atorvastatin (LIPITOR) 80 MG tablet [ATORVASTATIN (LIPITOR) 80 MG TABLET] TAKE 1 TABLET BY MOUTH EVERYDAY AT BEDTIME 10/17/20  Yes Carlos Ferreira MD   fluticasone-salmeterol (ADVAIR DISKUS) 250-50 MCG/ACT inhaler Inhale 1 puff into the lungs 2 times daily 6/29/23  Yes Carlos Ferreira MD   losartan (COZAAR) 100 MG tablet Take 100 mg by mouth daily 3/3/22  Yes Reported, Patient       Allergies:No Known Allergies    Immunizations:   Immunization History   Administered Date(s) Administered     COVID-19 Bivalent 12+ (Pfizer) 10/22/2022     COVID-19 Monovalent 18+ (Moderna) 03/13/2021, 04/10/2021, 11/23/2021     DT (PEDS <7y) 01/01/1999     Influenza Vaccine 50-64 or 18-64 w/egg allergy (Flublok) 01/29/2021     Influenza Vaccine >6 months (Alfuria,Fluzone) 10/20/2017, 01/10/2019, 11/13/2019, 11/16/2019     Influenza Vaccine, 6+MO IM (QUADRIVALENT W/PRESERVATIVES) 10/07/2016     Influenza,INJ,MDCK,PF,Quad >6mo(Flucelvax) 10/22/2022     TDAP (Adacel,Boostrix) 06/11/2009      Td,adult,historic,unspecified 2009       Health Maintenance: Vaccines discussed and need for pneumonia vaccine plus shingles vaccine and tetanus vaccine.    Past Medical History: Coronary artery disease with history of hypertension and hyperlipidemia followed by cardiology.  1 coronary stent previously deployed [facility] after myocardial infarction.  LAD stable without chest pain.  May be musculoskeletal.    Past Surgical History: Prior history of tympanoplasty right side for infections with recurrent ear tubes.  1 coronary stent LAD followed by cardiology.  Stable tonsillectomy in his youth.  No anesthetic complications from any prior surgery.    Family History: Mother  pancreatic cancer 67.    Father  74 heart disease.  3 children well wife well 1 child is  no grandchildren.    Social History: Enjoys playing golf remains active.  Also plays hockey.  Played hockey in high school.  [employer].  [employer].    Exam easily conversant highly intelligent not in acute distress not toxic.  Had COVID a year ago fully recovered.    Skin negative lymph negative neuro negative head eyes ears nose throat negative tympanoplasty done right side back straight no severe spine tenderness chest clear heart tones normal abdomen benign mild centripetal obesity discussed.  BMI 29 extremities free of edema cyanosis or clubbing genital rectal exam negative small prostate no testicular masses no groin hernias.  We had a good discussion.    Assessment and Plan  General medical examination at health care facility today check hemogram comprehensive metabolic profile urinalysis lipid panel PSA TSH.  Vaccine status discussed along with colonoscopy and need for periodic colonoscopies emphasized.  Encourage follow-up with cardiology.  History of 1 coronary stent LAD.  Stable    Chronic kidney disease with mild history of elevation in serum creatinine level.  Discussed see above.  Stable    The  following high BMI interventions were performed this visit: encouragement to exercise    Carlos Ferreira MD    Patient Active Problem List   Diagnosis     Sinus Bradycardia     Essential Hypertension     Adult Sleep Apnea     General medical exam     Chronic kidney disease, stage 3a (H)     Answers for HPI/ROS submitted by the patient on 6/28/2023  Frequency of exercise:: 2-3 days/week  Getting at least 3 servings of Calcium per day:: Yes  Diet:: Regular (no restrictions)  Taking medications regularly:: Yes  Medication side effects:: None  Bi-annual eye exam:: Yes  Dental care twice a year:: Yes  Sleep apnea or symptoms of sleep apnea:: Daytime drowsiness, Sleep apnea  abdominal pain: No  Blood in stool: No  Blood in urine: No  chest pain: No  chills: No  congestion: No  constipation: No  cough: No  diarrhea: No  dizziness: No  ear pain: No  eye pain: No  nervous/anxious: No  fever: No  frequency: No  genital sores: No  headaches: No  hearing loss: No  heartburn: No  arthralgias: No  joint swelling: No  peripheral edema: No  mood changes: No  myalgias: No  nausea: No  dysuria: No  palpitations: No  Skin sensation changes: No  sore throat: No  urgency: No  rash: No  shortness of breath: No  visual disturbance: No  weakness: No  impotence: No  penile discharge: No  Additional concerns today:: Yes  Duration of exercise:: Greater than 60 minutes

## 2023-08-06 ENCOUNTER — HEALTH MAINTENANCE LETTER (OUTPATIENT)
Age: 58
End: 2023-08-06

## 2023-09-07 ENCOUNTER — TRANSFERRED RECORDS (OUTPATIENT)
Dept: HEALTH INFORMATION MANAGEMENT | Facility: CLINIC | Age: 58
End: 2023-09-07
Payer: COMMERCIAL

## 2024-02-23 ENCOUNTER — TRANSFERRED RECORDS (OUTPATIENT)
Dept: HEALTH INFORMATION MANAGEMENT | Facility: CLINIC | Age: 59
End: 2024-02-23
Payer: COMMERCIAL

## 2024-07-15 DIAGNOSIS — Z00.00 ROUTINE GENERAL MEDICAL EXAMINATION AT A HEALTH CARE FACILITY: ICD-10-CM

## 2024-07-15 RX ORDER — FLUTICASONE PROPIONATE AND SALMETEROL 250; 50 UG/1; UG/1
1 POWDER RESPIRATORY (INHALATION) 2 TIMES DAILY
Qty: 1 EACH | Refills: 11 | Status: SHIPPED | OUTPATIENT
Start: 2024-07-15 | End: 2024-08-01

## 2024-07-27 SDOH — HEALTH STABILITY: PHYSICAL HEALTH: ON AVERAGE, HOW MANY MINUTES DO YOU ENGAGE IN EXERCISE AT THIS LEVEL?: 120 MIN

## 2024-07-27 SDOH — HEALTH STABILITY: PHYSICAL HEALTH: ON AVERAGE, HOW MANY DAYS PER WEEK DO YOU ENGAGE IN MODERATE TO STRENUOUS EXERCISE (LIKE A BRISK WALK)?: 2 DAYS

## 2024-07-27 ASSESSMENT — SOCIAL DETERMINANTS OF HEALTH (SDOH): HOW OFTEN DO YOU GET TOGETHER WITH FRIENDS OR RELATIVES?: THREE TIMES A WEEK

## 2024-07-31 ENCOUNTER — TRANSFERRED RECORDS (OUTPATIENT)
Dept: HEALTH INFORMATION MANAGEMENT | Facility: CLINIC | Age: 59
End: 2024-07-31
Payer: COMMERCIAL

## 2024-08-01 ENCOUNTER — OFFICE VISIT (OUTPATIENT)
Dept: INTERNAL MEDICINE | Facility: CLINIC | Age: 59
End: 2024-08-01
Payer: COMMERCIAL

## 2024-08-01 ENCOUNTER — TELEPHONE (OUTPATIENT)
Dept: INTERNAL MEDICINE | Facility: CLINIC | Age: 59
End: 2024-08-01

## 2024-08-01 VITALS
TEMPERATURE: 97.9 F | DIASTOLIC BLOOD PRESSURE: 90 MMHG | BODY MASS INDEX: 27.13 KG/M2 | HEART RATE: 63 BPM | OXYGEN SATURATION: 99 % | WEIGHT: 200.3 LBS | RESPIRATION RATE: 17 BRPM | HEIGHT: 72 IN | SYSTOLIC BLOOD PRESSURE: 160 MMHG

## 2024-08-01 DIAGNOSIS — Z11.59 NEED FOR HEPATITIS C SCREENING TEST: ICD-10-CM

## 2024-08-01 DIAGNOSIS — Z00.00 ROUTINE GENERAL MEDICAL EXAMINATION AT A HEALTH CARE FACILITY: ICD-10-CM

## 2024-08-01 DIAGNOSIS — Z11.4 SCREENING FOR HIV (HUMAN IMMUNODEFICIENCY VIRUS): ICD-10-CM

## 2024-08-01 DIAGNOSIS — N18.31 CHRONIC KIDNEY DISEASE, STAGE 3A (H): ICD-10-CM

## 2024-08-01 DIAGNOSIS — Z00.00 ROUTINE GENERAL MEDICAL EXAMINATION AT A HEALTH CARE FACILITY: Primary | ICD-10-CM

## 2024-08-01 LAB
ALBUMIN SERPL BCG-MCNC: 4.4 G/DL (ref 3.5–5.2)
ALBUMIN UR-MCNC: NEGATIVE MG/DL
ALP SERPL-CCNC: 85 U/L (ref 40–150)
ALT SERPL W P-5'-P-CCNC: 23 U/L (ref 0–70)
ANION GAP SERPL CALCULATED.3IONS-SCNC: 9 MMOL/L (ref 7–15)
APPEARANCE UR: CLEAR
AST SERPL W P-5'-P-CCNC: 18 U/L (ref 0–45)
BILIRUB SERPL-MCNC: 0.8 MG/DL
BILIRUB UR QL STRIP: NEGATIVE
BUN SERPL-MCNC: 16.5 MG/DL (ref 8–23)
CALCIUM SERPL-MCNC: 9 MG/DL (ref 8.8–10.4)
CHLORIDE SERPL-SCNC: 104 MMOL/L (ref 98–107)
CHOLEST SERPL-MCNC: 97 MG/DL
COLOR UR AUTO: YELLOW
CREAT SERPL-MCNC: 1.47 MG/DL (ref 0.67–1.17)
EGFRCR SERPLBLD CKD-EPI 2021: 55 ML/MIN/1.73M2
ERYTHROCYTE [DISTWIDTH] IN BLOOD BY AUTOMATED COUNT: 12.2 % (ref 10–15)
FASTING STATUS PATIENT QL REPORTED: YES
FASTING STATUS PATIENT QL REPORTED: YES
GLUCOSE SERPL-MCNC: 101 MG/DL (ref 70–99)
GLUCOSE UR STRIP-MCNC: NEGATIVE MG/DL
HCO3 SERPL-SCNC: 28 MMOL/L (ref 22–29)
HCT VFR BLD AUTO: 41.6 % (ref 40–53)
HDLC SERPL-MCNC: 35 MG/DL
HGB BLD-MCNC: 14.5 G/DL (ref 13.3–17.7)
HGB UR QL STRIP: NEGATIVE
KETONES UR STRIP-MCNC: NEGATIVE MG/DL
LDLC SERPL CALC-MCNC: 45 MG/DL
LEUKOCYTE ESTERASE UR QL STRIP: NEGATIVE
MCH RBC QN AUTO: 29.8 PG (ref 26.5–33)
MCHC RBC AUTO-ENTMCNC: 34.9 G/DL (ref 31.5–36.5)
MCV RBC AUTO: 86 FL (ref 78–100)
NITRATE UR QL: NEGATIVE
NONHDLC SERPL-MCNC: 62 MG/DL
PH UR STRIP: 6 [PH] (ref 5–8)
PLATELET # BLD AUTO: 267 10E3/UL (ref 150–450)
POTASSIUM SERPL-SCNC: 4.4 MMOL/L (ref 3.4–5.3)
PROT SERPL-MCNC: 6.7 G/DL (ref 6.4–8.3)
PSA SERPL DL<=0.01 NG/ML-MCNC: 0.76 NG/ML (ref 0–3.5)
RBC # BLD AUTO: 4.86 10E6/UL (ref 4.4–5.9)
SODIUM SERPL-SCNC: 141 MMOL/L (ref 135–145)
SP GR UR STRIP: 1.01 (ref 1–1.03)
TRIGL SERPL-MCNC: 85 MG/DL
TSH SERPL DL<=0.005 MIU/L-ACNC: 3.58 UIU/ML (ref 0.3–4.2)
UROBILINOGEN UR STRIP-ACNC: 0.2 E.U./DL
WBC # BLD AUTO: 7 10E3/UL (ref 4–11)

## 2024-08-01 PROCEDURE — 81003 URINALYSIS AUTO W/O SCOPE: CPT | Performed by: INTERNAL MEDICINE

## 2024-08-01 PROCEDURE — 99396 PREV VISIT EST AGE 40-64: CPT | Performed by: INTERNAL MEDICINE

## 2024-08-01 PROCEDURE — 80061 LIPID PANEL: CPT | Performed by: INTERNAL MEDICINE

## 2024-08-01 PROCEDURE — 36415 COLL VENOUS BLD VENIPUNCTURE: CPT | Performed by: INTERNAL MEDICINE

## 2024-08-01 PROCEDURE — G0103 PSA SCREENING: HCPCS | Performed by: INTERNAL MEDICINE

## 2024-08-01 PROCEDURE — 85027 COMPLETE CBC AUTOMATED: CPT | Performed by: INTERNAL MEDICINE

## 2024-08-01 PROCEDURE — 84443 ASSAY THYROID STIM HORMONE: CPT | Performed by: INTERNAL MEDICINE

## 2024-08-01 PROCEDURE — 80053 COMPREHEN METABOLIC PANEL: CPT | Performed by: INTERNAL MEDICINE

## 2024-08-01 RX ORDER — FLUTICASONE PROPIONATE AND SALMETEROL 250; 50 UG/1; UG/1
1 POWDER RESPIRATORY (INHALATION) 2 TIMES DAILY
Qty: 1 EACH | Refills: 11 | Status: SHIPPED | OUTPATIENT
Start: 2024-08-01

## 2024-08-01 RX ORDER — ATORVASTATIN CALCIUM 80 MG/1
80 TABLET, FILM COATED ORAL DAILY
Qty: 90 TABLET | Refills: 11 | Status: SHIPPED | OUTPATIENT
Start: 2024-08-01

## 2024-08-01 RX ORDER — LOSARTAN POTASSIUM 100 MG/1
100 TABLET ORAL DAILY
Qty: 90 TABLET | Refills: 11 | Status: SHIPPED | OUTPATIENT
Start: 2024-08-01

## 2024-08-01 RX ORDER — ATORVASTATIN CALCIUM 80 MG/1
80 TABLET, FILM COATED ORAL DAILY
Qty: 90 TABLET | Refills: 11 | Status: SHIPPED | OUTPATIENT
Start: 2024-08-01 | End: 2024-08-01

## 2024-08-01 RX ORDER — LOSARTAN POTASSIUM 100 MG/1
100 TABLET ORAL DAILY
Qty: 90 TABLET | Refills: 11 | Status: SHIPPED | OUTPATIENT
Start: 2024-08-01 | End: 2024-08-01

## 2024-08-01 ASSESSMENT — PAIN SCALES - GENERAL: PAINLEVEL: NO PAIN (0)

## 2024-08-01 NOTE — PROGRESS NOTES
Office Visit - Physical    Oz Atkinson   59 year old male    Date of Visit: 8/1/2024    Chief Complaint   Patient presents with    Physical       Subjective: Physical examination for this 59-year-old  working for [employer].  Father to 3 children 1 recent grand daughter non-smoker occasional alcohol not daily.  Trained [employer] plus a masters degree in engineering from there as well.  History of coronary stent times 1/20/2015 followed by Dr. Gisel Rodriguez from cardiology.  Prior surgeries without anesthetic complications include tympanoplasty tonsillectomy and ear tubes.  LAD was the site of the coronary stent deployment history of hypertension hyperlipidemia and refills of atorvastatin and losartan given.  Today 160/90 and will defer to Dr. Rodriguez from cardiology regarding changes in antihypertensive meds.    ROS: A comprehensive review of systems was performed and was otherwise negative    Medications:   Prior to Admission medications    Medication Sig Start Date End Date Taking? Authorizing Provider   aspirin 81 mg chewable tablet [ASPIRIN 81 MG CHEWABLE TABLET] 81 mg. 6/26/15  Yes Provider, Historical   atorvastatin (LIPITOR) 80 MG tablet Take 1 tablet (80 mg) by mouth daily 8/1/24  Yes Carlos Ferreira MD   fluticasone-salmeterol (ADVAIR) 250-50 MCG/ACT inhaler TAKE 1 PUFF BY MOUTH TWICE A DAY 7/15/24  Yes Carlos Ferreira MD   losartan (COZAAR) 100 MG tablet Take 1 tablet (100 mg) by mouth daily 8/1/24  Yes Carlos Ferreira MD       Allergies:No Known Allergies    Immunizations:   Immunization History   Administered Date(s) Administered    COVID-19 Bivalent 12+ (Pfizer) 10/22/2022    COVID-19 Monovalent 18+ (Moderna) 03/13/2021, 04/10/2021, 11/23/2021    DT (PEDS <7y) 01/01/1999    Influenza Vaccine 18-64 (Flublok) 01/29/2021    Influenza Vaccine >6 months,quad, PF 10/20/2017, 01/10/2019, 11/13/2019, 11/16/2019    Influenza  Vaccine, 6+MO IM (QUADRIVALENT W/PRESERVATIVES) 10/07/2016    Influenza,INJ,MDCK,PF,Quad >6mo(Flucelvax) 10/22/2022    TDAP (Adacel,Boostrix) 2009    Td,adult,historic,unspecified 2009       Health Maintenance: Immunizations reviewed and up-to-date colonoscopy due .  Vaccines and reviewed and up-to-date.    Past Medical History: Coronary artery disease with history of myocardial infarction and 1 coronary stent previously deployed LAD  followed by cardiology Dr. Gisel Rodriguez from [facility].  Part of the [facility] system.    Past Surgical History: No anesthetic complications from any prior surgery.    Family History: Mother  pancreatic cancer 67.    Father  74 heart disease.  Wife well 3 children 1 granddaughter recently born children and grandchildren all good as is his wife.    Social History: Enjoys playing golf was athletic in high school college and currently still plays hockey.  Also enjoys playing golf.  Active with family.    Exam easily conversant good spirited not in acute distress or toxic he appears well younger than stated age his blood pressure was slightly elevated and his BMI was nearly 28 discussed.  Chest clear heart tones normal abdomen benign extremities free of edema neck veins nondistended no carotid bruits no abdominal bruits.  Skin negative lymph negative neuro negative genital rectal exam negative mild prostate enlargement without nodularity induration rest the rectal exam negative testicular scrotal exam negative no groin hernias.  Good pulse noted in all 4 extremities he is athletic and build excellent neuromuscular tone he appeared healthy and well.  There was no thyromegaly or thyroid nodules no carotid bruits no lymphadenopathy appreciated lymph bearing areas some chronic neck pain which I discussed the importance of core strengthening exercises including neck strap muscle exercises for strengthening.    Assessment and Plan  General medical  examination at health care facility today check hemogram comprehensive metabolic profile plus lipid panel PSA TSH urinalysis.  We had a good discussion and a good examination today.  Colonoscopy was due and is due 2026 last colonoscopy all[facility]AR blood pressure was mildly elevated as was BMI and will defer to cardiology regarding any changes in antihypertensive regimen.  Currently refills given for atorvastatin 80 mg daily plus losartan 100 mg daily.  Salt restriction recommended.  The patient denies any signs or symptoms of cardiac dysrhythmia or volume overload associate with prior history of coronary artery disease and myocardial infarction 2015 with history of coronary stent placed LAD.  Currently asymptomatic.        Carlos Ferreira MD    Internal Medicine    Patient Active Problem List   Diagnosis    Sinus Bradycardia    Essential Hypertension    Adult Sleep Apnea    General medical exam    Chronic kidney disease, stage 3a (H)

## 2024-08-01 NOTE — TELEPHONE ENCOUNTER
Topic: Non-Medical Question.     During my annual physical with Dr. Ferreira, I let his assistant Hugo know that my preferred pharmacy was [facility].  I forgot to mention that my maintenance prescriptions (Losartan, Atorvastatin, and Wixela Inhub) are filled through [facility] per my insurance requirements.  After my visit today, I noticed that my maintenance prescription refills were ordered through [facility].  Please switch those prescription refills for Losartan, Atorvastatin, and Wixela Inhub to [facility] on [facility] at your earliest convenience.  Thank you!  Oz Atkinson,  1965

## 2024-08-01 NOTE — PROGRESS NOTES
Preventive Care Visit  [facility]  Carlos Ferreira MD, Internal Medicine  Aug 1, 2024  {Provider  Link to SmartSet :471597}    {PROVIDER CHARTING PREFERENCE:920261}    Shamika Griffin is a 59 year old, presenting for the following:  Physical    {(!) Visit Details have not yet been documented.  Please enter Visit Details and then use this list to pull in documentation. (Optional):345302}     Health Care Directive  Patient does not have a Health Care Directive or Living Will: {ADVANCE_DIRECTIVE_STATUS:429529}    HPI  ***  {MA/LPN/RN Pre-Provider Visit Orders- hCG/UA/Strep (Optional):331924}  {SUPERLIST (Optional):206420}  {additonal problems for provider to add (Optional):029021}      7/27/2024   General Health   How would you rate your overall physical health? Good   Feel stress (tense, anxious, or unable to sleep) Rather much      (!) STRESS CONCERN      7/27/2024   Nutrition   Three or more servings of calcium each day? Yes   Diet: Regular (no restrictions)   How many servings of fruit and vegetables per day? (!) 2-3   How many sweetened beverages each day? (!) 2            7/27/2024   Exercise   Days per week of moderate/strenous exercise 2 days   Average minutes spent exercising at this level 120 min      (!) EXERCISE CONCERN      7/27/2024   Social Factors   Frequency of gathering with friends or relatives Three times a week   Worry food won't last until get money to buy more No   Food not last or not have enough money for food? No   Do you have housing? (Housing is defined as stable permanent housing and does not include staying ouside in a car, in a tent, in an abandoned building, in an overnight shelter, or couch-surfing.) Yes   Are you worried about losing your housing? No   Lack of transportation? No   Unable to get utilities (heat,electricity)? No            7/27/2024   Fall Risk   Fallen 2 or more times in the past year? No   Trouble with walking or balance? No              7/27/2024   Dental   Dentist two times every year? Yes            7/27/2024   TB Screening   Were you born outside of the [place]? No            Today's PHQ-2 Score:       7/31/2024    10:05 AM   PHQ-2 ( 1999 [employer])   Q1: Little interest or pleasure in doing things 0   Q2: Feeling down, depressed or hopeless 0   PHQ-2 Score 0   Q1: Little interest or pleasure in doing things Not at all   Q2: Feeling down, depressed or hopeless Not at all   PHQ-2 Score 0           7/27/2024   Substance Use   Alcohol more than 3/day or more than 7/wk No   Do you use any other substances recreationally? No        Social History     Tobacco Use    Smoking status: Never    Smokeless tobacco: Never    Tobacco comments:     parents smoked   Substance Use Topics    Alcohol use: Yes     Alcohol/week: 3.0 standard drinks of alcohol    Drug use: Never     {Provider  If there are gaps in the social history shown above, please follow the link to update and then refresh the note Link to Social and Substance History :027610}        7/27/2024   One time HIV Screening   Previous HIV test? No          7/27/2024   STI Screening   New sexual partner(s) since last STI/HIV test? No      Last PSA:   Prostate Specific Antigen Screen   Date Value Ref Range Status   06/29/2023 0.71 0.00 - 3.50 ng/mL Final   04/26/2022 0.44 0.00 - 3.50 ug/L Final     ASCVD Risk   The ASCVD Risk score (Jill SALVADOR, et al., 2019) failed to calculate for the following reasons:    The valid total cholesterol range is 130 to 320 mg/dL    {Link to Fracture Risk Assessment Tool (Optional):041395}    {Provider  REQUIRED FOR AWV Use the storyboard to review patient history, after sections have been marked as reviewed, refresh note to capture documentation:376724}   Reviewed and updated as needed this visit by Provider                    {HISTORY OPTIONS (Optional):174979}    {ROS Picklists (Optional):050415}     Objective    Exam  BP (!) 160/90 (BP Location: Right arm, Patient  "Position: Sitting, Cuff Size: Adult Regular)   Pulse 63   Temp 97.9  F (36.6  C) (Oral)   Resp 17   Ht 1.816 m (5' 11.5\")   Wt 90.9 kg (200 lb 4.8 oz)   SpO2 99%   BMI 27.55 kg/m     Estimated body mass index is 27.55 kg/m  as calculated from the following:    Height as of this encounter: 1.816 m (5' 11.5\").    Weight as of this encounter: 90.9 kg (200 lb 4.8 oz).    Physical Exam  {Exam Choices (Optional):974732}        Signed Electronically by: Carlos Ferreira MD  {Email feedback regarding this note to [email or web address]   :380640}  "

## 2024-09-02 ENCOUNTER — ANCILLARY PROCEDURE (OUTPATIENT)
Dept: GENERAL RADIOLOGY | Facility: CLINIC | Age: 59
End: 2024-09-02
Attending: PHYSICIAN ASSISTANT
Payer: COMMERCIAL

## 2024-09-02 ENCOUNTER — OFFICE VISIT (OUTPATIENT)
Dept: URGENT CARE | Facility: URGENT CARE | Age: 59
End: 2024-09-02
Payer: COMMERCIAL

## 2024-09-02 VITALS
OXYGEN SATURATION: 98 % | TEMPERATURE: 98 F | RESPIRATION RATE: 18 BRPM | HEART RATE: 68 BPM | SYSTOLIC BLOOD PRESSURE: 172 MMHG | DIASTOLIC BLOOD PRESSURE: 81 MMHG
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
## 2024-09-02 DIAGNOSIS — M79.641 PAIN OF RIGHT HAND: ICD-10-CM

## 2024-09-02 DIAGNOSIS — S62.234A CLOSED NONDISPLACED FRACTURE OF BASE OF FIRST METACARPAL BONE OF RIGHT HAND, UNSPECIFIED FRACTURE MORPHOLOGY, INITIAL ENCOUNTER: Primary | ICD-10-CM

## 2024-09-02 PROCEDURE — 73130 X-RAY EXAM OF HAND: CPT | Mod: TC | Performed by: RADIOLOGY

## 2024-09-02 PROCEDURE — 99213 OFFICE O/P EST LOW 20 MIN: CPT | Performed by: PHYSICIAN ASSISTANT
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
## 2024-09-02 NOTE — PATIENT INSTRUCTIONS
It looks like you have a tiny fracture of your thumb  I will have you wear this   Tylenol for pain relief  Ice to the area  REST  Follow-up with ortho referral in one week for a recheck    .   Seek emergency care if you develop severe pain/swelling, inability to move extremity, numbness / tingling, weakness, skin paleness, or icy cold extremity.

## 2024-09-06 ENCOUNTER — TRANSFERRED RECORDS (OUTPATIENT)
Dept: HEALTH INFORMATION MANAGEMENT | Facility: CLINIC | Age: 59
End: 2024-09-06
Payer: COMMERCIAL

## 2024-09-28 ENCOUNTER — HEALTH MAINTENANCE LETTER (OUTPATIENT)
Age: 59
End: 2024-09-28

## 2025-01-29 ENCOUNTER — MYC MEDICAL ADVICE (OUTPATIENT)
Dept: INTERNAL MEDICINE | Facility: CLINIC | Age: 60
End: 2025-01-29
Payer: COMMERCIAL

## 2025-01-29 DIAGNOSIS — Z00.00 ROUTINE GENERAL MEDICAL EXAMINATION AT A HEALTH CARE FACILITY: Primary | ICD-10-CM

## 2025-05-14 ASSESSMENT — SLEEP AND FATIGUE QUESTIONNAIRES
HOW LIKELY ARE YOU TO NOD OFF OR FALL ASLEEP IN A CAR, WHILE STOPPED FOR A FEW MINUTES IN TRAFFIC: WOULD NEVER DOZE
HOW LIKELY ARE YOU TO NOD OFF OR FALL ASLEEP WHILE LYING DOWN TO REST IN THE AFTERNOON WHEN CIRCUMSTANCES PERMIT: HIGH CHANCE OF DOZING
HOW LIKELY ARE YOU TO NOD OFF OR FALL ASLEEP WHILE SITTING AND READING: SLIGHT CHANCE OF DOZING
HOW LIKELY ARE YOU TO NOD OFF OR FALL ASLEEP WHILE SITTING INACTIVE IN A PUBLIC PLACE: SLIGHT CHANCE OF DOZING
HOW LIKELY ARE YOU TO NOD OFF OR FALL ASLEEP WHILE SITTING QUIETLY AFTER LUNCH WITHOUT ALCOHOL: SLIGHT CHANCE OF DOZING
HOW LIKELY ARE YOU TO NOD OFF OR FALL ASLEEP WHILE WATCHING TV: MODERATE CHANCE OF DOZING
HOW LIKELY ARE YOU TO NOD OFF OR FALL ASLEEP WHILE SITTING AND TALKING TO SOMEONE: WOULD NEVER DOZE
HOW LIKELY ARE YOU TO NOD OFF OR FALL ASLEEP WHEN YOU ARE A PASSENGER IN A CAR FOR AN HOUR WITHOUT A BREAK: SLIGHT CHANCE OF DOZING
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
## 2025-05-19 ENCOUNTER — VIRTUAL VISIT (OUTPATIENT)
Dept: SLEEP MEDICINE | Facility: CLINIC | Age: 60
End: 2025-05-19
Attending: INTERNAL MEDICINE
Payer: COMMERCIAL

## 2025-05-19 VITALS — HEIGHT: 71 IN | BODY MASS INDEX: 28 KG/M2 | WEIGHT: 200 LBS

## 2025-05-19 DIAGNOSIS — I10 ESSENTIAL HYPERTENSION: ICD-10-CM

## 2025-05-19 DIAGNOSIS — G47.33 OSA (OBSTRUCTIVE SLEEP APNEA): Primary | ICD-10-CM

## 2025-05-19 DIAGNOSIS — I25.10 CORONARY ARTERY DISEASE INVOLVING NATIVE CORONARY ARTERY OF NATIVE HEART WITHOUT ANGINA PECTORIS: ICD-10-CM

## 2025-05-19 PROCEDURE — 98002 SYNCH AUDIO-VIDEO NEW MOD 45: CPT | Performed by: PHYSICIAN ASSISTANT

## 2025-05-19 PROCEDURE — 1126F AMNT PAIN NOTED NONE PRSNT: CPT | Mod: 95 | Performed by: PHYSICIAN ASSISTANT

## 2025-05-19 RX ORDER — HYDROCHLOROTHIAZIDE 25 MG/1
25 TABLET ORAL DAILY
COMMUNITY

## 2025-05-19 ASSESSMENT — PAIN SCALES - GENERAL: PAINLEVEL_OUTOF10: NO PAIN (0)

## 2025-05-19 NOTE — PATIENT INSTRUCTIONS
"          MY TREATMENT INFORMATION FOR SLEEP APNEA-  Oz Atkinson  Frequently asked questions:  1. What is Obstructive Sleep Apnea (LUIS)? LUIS is the most common type of sleep apnea. Apnea means, \"without breath.\"  Apnea is most often caused by narrowing or collapse of the upper airway as muscles relax during sleep.   Almost everyone has occasional apneas. Most people with sleep apnea have had brief interruptions at night frequently for many years.  The severity of sleep apnea is related to how frequent and severe the events are.   2. What are the consequences of LUIS? Symptoms include: feeling sleepy during the day, snoring loudly, gasping or stopping of breathing, trouble sleeping, and occasionally morning headaches or heartburn at night.  Sleepiness can be serious and even increase the risk of falling asleep while driving. Other health consequences may include development of high blood pressure and other cardiovascular disease in persons who are susceptible. Untreated LUIS  can contribute to heart disease, stroke and diabetes.   3. What are the treatment options? In most situations, sleep apnea is a lifelong disease that must be managed with daily therapy. Medications are not effective for sleep apnea and surgery is generally not considered until other therapies have been tried. Your treatment is your choice . Continuous Positive Airway (CPAP) works right away and is the therapy that is effective in nearly everyone. An oral device to hold your jaw forward is usually the next most reliable option. Other options include postioning devices (to keep you off your back), weight loss, and surgery including a tongue pacing device. There is more detail about some of these options below.  4. Are my sleep studies covered by insurance? Although we will request verification of coverage, we advise you also check in advance of the study to ensure there is coverage.    Important tips for those choosing CPAP and similar " devices  REMEMBER-IF YOU RECEIVE A CALL FROM  [phone]-->IT IS TO SETUP A DEVICE  For new devices, sign up for device APOLONIA to monitor your device for your followup visits  We encourage you to utilize the [employer]. apolonia or website ( https://[employer]/ ) to monitor your therapy progress and share the data with your healthcare team when you discuss your sleep apnea.                                                    Know your equipment:  CPAP is continuous positive airway pressure that prevents obstructive sleep apnea by keeping the throat from collapsing while you are sleeping. In most cases, the device is  smart  and can slowly self-adjusts if your throat collapses and keeps a record every day of how well you are treated-this information is available to you and your care team.  BPAP is bilevel positive airway pressure that keeps your throat open and also assists each breath with a pressure boost to maintain adequate breathing.  Special kinds of BPAP are used in patients who have inadequate breathing from lung or heart disease. In most cases, the device is  smart  and can slowly self-adjusts to assist breathing. Like CPAP, the device keeps a record of how well you are treated.  Your mask is your connection to the device. You get to choose what feels most comfortable and the staff will help to make sure if fits. Here: are some examples of the different masks that are available: Magnetic mask aids may assist with use but there are safety issues that should be addressed when considering with magnets* ( see end of discussion).       Key points to remember on your journey with sleep apnea:  Sleep study.  PAP devices often need to be adjusted during a sleep study to show that they are effective and adjusted right.  Good tips to remember: Try wearing just the mask during a quiet time during the day so your body adapts to wearing it. A humidifier is recommended for comfort in most cases to prevent drying of  your nose and throat. Allergy medication from your provider may help you if you are having nasal congestion.  Getting settled-in. It takes more than one night for most of us to get used to wearing a mask. Try wearing just the mask during a quiet time during the day so your body adapts to wearing it. A humidifier is recommended for comfort in most cases. Our team will work with you carefully on the first day and will be in contact within 4 days and again at 2 and 4 weeks for advice and remote device adjustments. Your therapy is evaluated by the device each day.   Use it every night. The more you are able to sleep naturally for 7-8 hours, the more likely you will have good sleep and to prevent health risks or symptoms from sleep apnea. Even if you use it 4 hours it helps. Occasionally all of us are unable to use a medical therapy, in sleep apnea, it is not dangerous to miss one night.   Communicate. Call our skilled team on the number provided on the first day if your visit for problems that make it difficult to wear the device. Over 2 out of 3 patients can learn to wear the device long-term with help from our team. Remember to call our team or your sleep providers if you are unable to wear the device as we may have other solutions for those who cannot adapt to mask CPAP therapy. It is recommended that you sleep your sleep provider within the first 3 months and yearly after that if you are not having problems.   Use it for your health. We encourage use of CPAP masks during daytime quiet periods to allow your face and brain to adapt to the sensation of CPAP so that it will be a more natural sensation to awaken to at night or during naps. This can be very useful during the first few weeks or months of adapting to CPAP though it does not help medically to wear CPAP during wakefulness and  should not be used as a strategy just to meet guidelines.  Take care of your equipment. Make sure you clean your mask and tubing using  directions every day and that your filter and mask are replaced as recommended or if they are not working.     *Masks with magnets:  Updated Contraindications  Masks with magnetic components are contraindicated for use by patients where they, or anyone in close physical contact while using the mask, have the following:   Active medical implants that interact with magnets (i.e., pacemakers, implantable cardioverter defibrillators (ICD), neurostimulators, cerebrospinal fluid (CSF) shunts, insulin/infusion pumps)   Metallic implants/objects containing ferromagnetic material (i.e., aneurysm clips/flow disruption devices, embolic coils, stents, valves, electrodes, implants to restore hearing or balance with implanted magnets, ocular implants, metallic splinters in the eye)  Updated Warning  Keep the mask magnets at a safe distance of at least 6 inches (150 mm) away from implants or medical devices that may be adversely affected by magnetic interference. This warning applies to you or anyone in close physical contact with your mask. The magnets are in the frame and lower headgear clips, with a magnetic field strength of up to 400mT. When worn, they connect to secure the mask but may inadvertently detach while asleep.  Implants/medical devices, including those listed within contraindications, may be adversely affected if they change function under external magnetic fields or contain ferromagnetic materials that attract/repel to magnetic fields (some metallic implants, e.g., contact lenses with metal, dental implants, metallic cranial plates, screws, alo hole covers, and bone substitute devices). Consult your physician and  of your implant / other medical device for information on the potential adverse effects of magnetic fields.    BESIDES CPAP, WHAT OTHER THERAPIES ARE THERE?    Positioning Device  Positioning devices are generally used when sleep apnea is mild and only occurs on your back.This example shows  a pillow that straps around the waist. It may be appropriate for those whose sleep study shows milder sleep apnea that occurs primarily when lying flat on one's back. Preliminary studies have shown benefit but effectiveness at home may need to be verified by a home sleep test. These devices are generally not covered by medical insurance.  Examples of devices that maintain sleeping on the back to prevent snoring and mild sleep apnea.    Belt type body positioner  http://[employer]/    Electronic reminder  http://nightshifttherapy.com/            Oral Appliance  What is oral appliance therapy?  An oral appliance device fits on your teeth at night like a retainer used after having braces. The device is made by a specialized dentist and requires several visits over 1-2 months before a manufactured device is made to fit your teeth and is adjusted to prevent your sleep apnea. Once an oral device is working properly, snoring should be improved. A home sleep test may be recommended at that time if to determine whether the sleep apnea is adequately treated.       Some things to remember:  -Oral devices are often, but not always, covered by your medical insurance. Be sure to check with your insurance provider.   -If you are referred for oral therapy, you will be given a list of specialized dentists to consider or you may choose to visit the Web site of the American Academy of Dental Sleep Medicine  -Oral devices are less likely to work if you have severe sleep apnea or are extremely overweight.     More detailed information  An oral appliance is a small acrylic device that fits over the upper and lower teeth  (similar to a retainer or a mouth guard). This device slightly moves jaw forward, which moves the base of the tongue forward, opens the airway, improves breathing for effective treat snoring and obstructive sleep apnea in perhaps 7 out of 10 people .  The best working devices are custom-made by a dental device   after a mold is made of the teeth 1, 2, 3.  When is an oral appliance indicated?  Oral appliance therapy is recommended as a first-line treatment for patients with primary snoring, mild sleep apnea, and for patients with moderate sleep apnea who prefer appliance therapy to use of CPAP4, 5. Severity of sleep apnea is determined by sleep testing and is based on the number of respiratory events per hour of sleep.   How successful is oral appliance therapy?  The success rate of oral appliance therapy in patients with mild sleep apnea is 75-80% while in patients with moderate sleep apnea it is 50-70%. The chance of success in patients with severe sleep apnea is 40-50%. The research also shows that oral appliances have a beneficial effect on the cardiovascular health of LUIS patients at the same magnitude as CPAP therapy7.  Oral appliances should be a second-line treatment in cases of severe sleep apnea, but if not completely successful then a combination therapy utilizing CPAP plus oral appliance therapy may be effective. Oral appliances tend to be effective in a broad range of patients although studies show that the patients who have the highest success are females, younger patients, those with milder disease, and less severe obesity. 3, 6.   Finding a dentist that practices dental sleep medicine  Specific training is available through the American Academy of Dental Sleep Medicine for dentists interested in working in the field of sleep. To find a dentist who is educated in the field of sleep and the use of oral appliances, near you, visit the Web site of the American Academy of Dental Sleep Medicine.    References  1. Deshawn, et al. Objectively measured vs self-reported compliance during oral appliance therapy for sleep-disordered breathing. Chest 2013; 144(5): 0736-2875.  2. Nahum et al. Objective measurement of compliance during oral appliance therapy for sleep-disordered breathing. Thorax  2013; 68(1): 91-96.  3. Benjie et al. Mandibular advancement devices in 620 men and women with LUIS and snoring: tolerability and predictors of treatment success. Chest 2004; 125: 7999-2643.  4. Hamida et al. Oral appliances for snoring and LUIS: a review. Sleep 2006; 29: 244-262.  5. Abhay et al. Oral appliance treatment for LUIS: an update. J Clin Sleep Med 2014; 10(2): 215-227.  6. Jaylyn et al. Predictors of OSAH treatment outcome. J Dent Res 2007; 86: 5245-1802.      Weight Loss:   Your Body mass index is 27.89 kg/m .    Being overweight does not necessarily mean you will have health consequences.  Those who have BMI over 30 or over 27 with existing medical conditions carries greater risk.   Weight loss decreases severity of sleep apnea in most people with obesity. For those with mild obesity who have developed snoring with weight gain, even 15-30 pound weight loss can improve and occasionally milder eliminate sleep apnea.  Structured and life-long dietary and health habits are necessary to lose weight and keep healthier weight levels.     The Comprehensive Weight loss program offers all aspects of weight loss strategies including two Non-Surgical Weight Loss Programs: Medical Weight Management and our 24 Week Healthy Lifestyle Program:  Medical Weight Management: You will meet with a Medical Weight Management Provider, as well as a Registered Dietician. The program may include medication therapy, dietary education, recommended exercise and physical therapy programs, monthly support group meetings, and possible psychological counseling. Follow up visits with the provider or dietician are scheduled based on your progress and needs.  24 Week Healthy Lifestyle Program: This unique program is designed to give you the support of weekly appointments and activities thru a 24-week period. It may include all of the components of the basic program (above), with the addition of 11 individual Health   Visits, 24-week access to the [employer] website for over 700 online classes, and monthly support group meetings. This program has an out-of-pocket expense of $499 to cover the items that can not be billed to insurance (health coaches and [employer] access), and is non-refundable/non-transferable (you may be able to use a Health Savings Account; ask your HSA provider). There may be an optional meal replacement plan prescribed as well.   Medication therapy has been approved for the treatment of sleep apnea: The FDA approved tirzepatide (ZEPBOUND) for moderate to severe sleep apnea (apnea-hypopnea index greater than or equal to 15) in patients with BMI of greater than or equal to 30, or BMI greater than or equal to 27 with at least 1 weight-related condition such as hypertension or dyslipidemia.  Surgical management achieves meaningful long-term weight loss and improvement in health risks in most patients with more severe obesity.      Sleep Apnea Surgery:    Surgery for obstructive sleep apnea is considered generally only when other therapies fail to work. Surgery may be discussed with you if you are having a difficult time tolerating CPAP and or when there is an abnormal structure that requires surgical correction.  Nose and throat surgeries often enlarge the airway to prevent collapse.  Most of these surgeries create pain for 1-2 weeks and up to half of the most common surgeries are not effective throughout life.  You should carefully discuss the benefits and drawbacks to surgery with your sleep provider and surgeon to determine if it is the best solution for you.   More information  Surgery for LUIS is directed at areas that are responsible for narrowing or complete obstruction of the airway during sleep.  There are a wide range of procedures available to enlarge and/or stabilize the airway to prevent blockage of breathing in the three major areas where it can occur: the palate, tongue, and nasal regions.  Successful  surgical treatment depends on the accurate identification of the factors responsible for obstructive sleep apnea in each person.  A personalized approach is required because there is no single treatment that works well for everyone.  Because of anatomic variation, consultation with an examination by a sleep surgeon is a critical first step in determining what surgical options are best for each patient.  In some cases, examination during sedation may be recommended in order to guide the selection of procedures.  Patients will be counseled about risks and benefits as well as the typical recovery course after surgery. Surgery is typically not a cure for a person s LUIS.  However, surgery will often significantly improve one s LUIS severity (termed  success rate ).  Even in the absence of a cure, surgery will decrease the cardiovascular risk associated with OSA7; improve overall quality of life8 (sleepiness, functionality, sleep quality, etc).      Palate Procedures:  Patients with LUIS often have narrowing of their airway in the region of their tonsils and uvula.  The goals of palate procedures are to widen the airway in this region as well as to help the tissues resist collapse.  Modern palate procedure techniques focus on tissue conservation and soft tissue rearrangement, rather than tissue removal.  Often the uvula is preserved in this procedure. Residual sleep apnea is common in patient after pharyngoplasty with an average reduction in sleep apnea events of 33%2.      Tongue Procedures:  ExamWhile patients are awake, the muscles that surround the throat are active and keep this region open for breathing. These muscles relax during sleep, allowing the tongue and other structures to collapse and block breathing.  There are several different tongue procedures available.  Selection of a tongue base procedure depends on characteristics seen on physical exam.  Generally, procedures are aimed at removing bulky tissues in  this area or preventing the back of the tongue from falling back during sleep.  Success rates for tongue surgery range from 50-62%3.    Hypoglossal Nerve Stimulation:  Hypoglossal nerve stimulation has recently received approval from the [place] Food and Drug Administration for the treatment of obstructive sleep apnea.  This is based on research showing that the system was safe and effective in treating sleep apnea6.  Results showed that the median AHI score decreased 68%, from 29.3 to 9.0. This therapy uses an implant system that senses breathing patterns and delivers mild stimulation to airway muscles, which keeps the airway open during sleep.  The system consists of three fully implanted components: a small generator (similar in size to a pacemaker), a breathing sensor, and a stimulation lead.  Using a small handheld remote, a patient turns the therapy on before bed and off upon awakening.    Candidates for this device must be greater than 18 years of age, have moderate to severe obstructive sleep apnea with less than 25% central events  (AHI between 15-65), BMI less than 35, have tried CPAP/oral appliance for at least 8 weeks without success, and have appropriate upper airway anatomy (determined by a sleep endoscopy performed by Dr. Mukesh Cabral or Dr. Adrian Carreno).     Nasal Procedures:  Nasal obstruction can interfere with nasal breathing during the day and night.  Studies have shown that relief of nasal obstruction can improve the ability of some patients to tolerate positive airway pressure therapy for obstructive sleep apnea1.  Treatment options include medications such as nasal saline, topical corticosteroid and antihistamine sprays, and oral medications such as antihistamines or decongestants. Non-surgical treatments can include external nasal dilators for selected patients. If these are not successful by themselves, surgery can improve the nasal airway either alone or in combination with these  other options.        Combination Procedures:  Combination of surgical procedures and other treatments may be recommended, particularly if patients have more than one area of narrowing or persistent positional disease.  The success rate of combination surgery ranges from 66-80%2,3.    References  Krystin COHEN. The Role of the Nose in Snoring and Obstructive Sleep Apnoea: An Update.  Eur Arch Otorhinolaryngol. 2011; 268: 1365-73.   Bert SM; Mireya JA; Sydnie JR; Pallanch JF; Lolita MB; Eagle SG; Brett CUEVAS. Surgical modifications of the upper airway for obstructive sleep apnea in adults: a systematic review and meta-analysis. SLEEP 2010;33(10):5593-6642. Elis WRIGHT. Hypopharyngeal surgery in obstructive sleep apnea: an evidence-based medicine review.  Arch Otolaryngol Head Neck Surg. 2006 Feb;132(2):206-13.  Raz YH1, Valarie Y, Edouard MARTINA. The efficacy of anatomically based multilevel surgery for obstructive sleep apnea. Otolaryngol Head Neck Surg. 2003 Oct;129(4):327-35.  Kezirian E, Goldberg A. Hypopharyngeal Surgery in Obstructive Sleep Apnea: An Evidence-Based Medicine Review. Arch Otolaryngol Head Neck Surg. 2006 Feb;132(2):206-13.  Luly LOZA et al. Upper-Airway Stimulation for Obstructive Sleep Apnea.  N Engl J Med. 2014 Jan 9;370(2):139-49.  Cuco Y et al. Increased Incidence of Cardiovascular Disease in Middle-aged Men with Obstructive Sleep Apnea. Am J Respir Crit Care Med; 2002 166: 159-165  Dior EM et al. Studying Life Effects and Effectiveness of Palatopharyngoplasty (SLEEP) study: Subjective Outcomes of Isolated Uvulopalatopharyngoplasty. Otolaryngol Head Neck Surg. 2011; 144: 623-631.        WHAT IF I ONLY HAVE SNORING?    Mandibular advancement devices, lateral sleep positioning, long-term weight loss and treatment of nasal allergies have been shown to improve snoring.  Exercising tongue muscles with a game (https://apps.[employer]/us/apolonia/soundly-reduce-snoring/ui9176003591) or stimulating the tongue  during the day with a device (https://doi.org/10.3390/byg09069670) have improved snoring in some individuals.  https://www.[employer]/  https://www.sleepfoundation.org/best-anti-snoring-mouthpieces-and-mouthguards    Remember to Drive Safe... Drive Alive     Sleep health profoundly affects your health, mood, and your safety.  Thirty three percent of the population (one in three of us) is not getting enough sleep and many have a sleep disorder. Not getting enough sleep or having an untreated / undertreated sleep condition may make us sleepy without even knowing it. In fact, our driving could be dramatically impaired due to our sleep health. As your provider, here are some things I would like you to know about driving:     Here are some warning signs for impairment and dangerous drowsy driving:              -Having been awake more than 16 hours               -Looking tired               -Eyelid drooping              -Head nodding (it could be too late at this point)              -Driving for more than 30 minutes     Some things you could do to make the driving safer if you are experiencing some drowsiness:              -Stop driving and rest              -Call for transportation              -Make sure your sleep disorder is adequately treated     Some things that have been shown NOT to work when experiencing drowsiness while driving:              -Turning on the radio              -Opening windows              -Eating any  distracting  /  entertaining  foods (e.g., sunflower seeds, candy, or any other)              -Talking on the phone      Your decision may not only impact your life, but also the life of others. Please, remember to drive safe for yourself and all of us.

## 2025-05-19 NOTE — NURSING NOTE
Current patient location: work     Is the patient currently in the state of [place]? YES    Visit mode: VIDEO    If the visit is dropped, the patient can be reconnected by:VIDEO VISIT: Text to cell phone:   Telephone Information:   Mobile [phone]       Will anyone else be joining the visit? NO  (If patient encounters technical issues they should call [phone]    Are changes needed to the allergy or medication list? No    Are refills needed on medications prescribed by this physician? NO    Rooming Documentation:  Questionnaire(s) completed    Reason for visit: Consult    Ayanna CHF

## 2025-05-26 NOTE — PROGRESS NOTES
Chief Complaint   Patient presents with    Nose Problem      Deviated Septum, would like to discuss surgery. PT reports that he played a lot of contact sports. In 2020 he ran into a tree branch and was knocked out. There were no broken bones but was told that he has a deviated septum that was an older injury. Has a hard time breathing through right side, nasal pain, and scabbing. PT has a hx of ear tubes and tonsil removal in childhood.      History of Present Illness   Oz Atkinson is a 60 year old male who presents today for evaluation. I am seeing this patient in consultation for deviated nasal septum at the request of the provider Dr. Carlos Ferreira. The patient describes symptoms of bilateral nasal obstruction for the past several years. The patient notes symptoms most predominately on the right side.  No significant history of epistaxis. The patient notes no past history of seasonal or environmental allergies. The patient has not been tested for allergies in the past. Treatments have included nasal saline and nasal steroids. The treatments seem to help very little.  He has had multiple previous episodes of nasal trauma, most recently in 2020 when his face was struck by a branch on a tree.  No prior history of nose or sinus surgery. No history of smoking. Patient reports bilateral nasal congestion and post nasal drainage.  He denies any significant rhinorrhea, taste/smell disturbance, or face pain/pressure/fullness.    He does have a history of obstructive sleep apnea and is currently trying to get back on CPAP.  He struggled with CPAP tolerance previously not being able to tolerate nasal mask due to his nasal breathing.    Past Medical History  Patient Active Problem List   Diagnosis    Sinus Bradycardia    Essential Hypertension    Adult Sleep Apnea    General medical exam    Chronic kidney disease, stage 3a (H)    STEMI (ST elevation myocardial infarction) (H)    Dyslipidemia    Coronary artery  disease without angina pectoris     Current Medications     Current Outpatient Medications:     clobetasol propionate (TEMOVATE) 0.05 % external cream, apply a thin layer topically TO eczema on leg twice daily for 14 days. THEN apply as needed for flares, Disp: , Rfl:     mupirocin (BACTROBAN) 2 % external ointment, Apply topically 3 times daily as needed (Nasal dryness/crusting)., Disp: 30 g, Rfl: 3    triamcinolone (KENALOG) 0.1 % external cream, , Disp: , Rfl:     aspirin 81 mg chewable tablet, [ASPIRIN 81 MG CHEWABLE TABLET] 81 mg., Disp: , Rfl:     atorvastatin (LIPITOR) 80 MG tablet, Take 1 tablet (80 mg) by mouth daily, Disp: 90 tablet, Rfl: 11    fluticasone-salmeterol (ADVAIR) 250-50 MCG/ACT inhaler, Inhale 1 puff into the lungs 2 times daily, Disp: 1 each, Rfl: 11    hydrochlorothiazide (HYDRODIURIL) 25 MG tablet, Take 25 mg by mouth daily., Disp: , Rfl:     losartan (COZAAR) 100 MG tablet, Take 1 tablet (100 mg) by mouth daily, Disp: 90 tablet, Rfl: 11    Allergies  No Known Allergies    Social History   Social History     Socioeconomic History    Marital status:    Tobacco Use    Smoking status: Never    Smokeless tobacco: Never    Tobacco comments:     parents smoked   Substance and Sexual Activity    Alcohol use: Yes     Alcohol/week: 3.0 standard drinks of alcohol    Drug use: Never    Sexual activity: Yes     Partners: Female     Birth control/protection: Post-menopausal   Other Topics Concern    Parent/sibling w/ CABG, MI or angioplasty before 65F 55M? No     Social Drivers of Health     Financial Resource Strain: Low Risk  (7/27/2024)    Financial Resource Strain     Within the past 12 months, have you or your family members you live with been unable to get utilities (heat, electricity) when it was really needed?: No   Food Insecurity: Low Risk  (7/27/2024)    Food Insecurity     Within the past 12 months, did you worry that your food would run out before you got money to buy more?: No      Within the past 12 months, did the food you bought just not last and you didn t have money to get more?: No   Transportation Needs: Low Risk  (7/27/2024)    Transportation Needs     Within the past 12 months, has lack of transportation kept you from medical appointments, getting your medicines, non-medical meetings or appointments, work, or from getting things that you need?: No   Physical Activity: Sufficiently Active (7/27/2024)    Exercise Vital Sign     Days of Exercise per Week: 2 days     Minutes of Exercise per Session: 120 min   Stress: Stress Concern Present (7/27/2024)    [employer] - Occupational Stress Questionnaire     Feeling of Stress : Rather much   Social Connections: Unknown (7/27/2024)    Social Connection and Isolation Panel [NHANES]     Frequency of Social Gatherings with Friends and Family: Three times a week   Interpersonal Safety: Low Risk  (8/1/2024)    Interpersonal Safety     Do you feel physically and emotionally safe where you currently live?: Yes     Within the past 12 months, have you been hit, slapped, kicked or otherwise physically hurt by someone?: No     Within the past 12 months, have you been humiliated or emotionally abused in other ways by your partner or ex-partner?: No   Housing Stability: Low Risk  (7/27/2024)    Housing Stability     Do you have housing? : Yes     Are you worried about losing your housing?: No       Family History  Family History   Problem Relation Age of Onset    Hypertension Mother     Other Cancer Mother         Pancreatic    Diabetes Father     Coronary Artery Disease Father     Hypertension Father     Cerebrovascular Disease Maternal Grandfather     Other Cancer Sister         Primary Pulmonary Hodgkins       Review of Systems  As per HPI and PMHx, otherwise 10+ comprehensive system review is negative.    Physical Exam  There were no vitals taken for this visit.  GENERAL: The patient is a pleasant, cooperative 60 year old  male in no acute distress.  HEAD: Normocephalic, atraumatic. Hair and scalp are normal.  EYES: Pupils are equal, round, reactive to light and accommodation. Extraocular movements are intact. The sclera nonicteric without injection. The extraocular structures are normal.  EARS: Normal shape and symmetry. No tenderness when palpating the mastoid or tragal areas bilaterally. No mastoid erythema or fluctuance.   NOSE: The patient has medium thickness skin.  His bony nasal dorsum is straight.  His middle third is quite pinched.  He has sagittal malpositioning of the lower lateral crura bilaterally with internal curvature bilaterally.  Gentle inhalation shows significant external nasal valve collapse greater on the left-hand side.  Modified [place] maneuver shows significant improvement in nasal breathing greater on the left.  Nares are patent.  Nasal mucosa is slightly dry.  Anterior rhinoscopy shows some mild signs of nasal vestibulitis.  The patient has a rightward anterior mid nasal septal deviation. The patient has severe/marked inferior turbinate hypertrophy bilaterally.  NEUROLOGIC: Cranial nerves II through XII are grossly intact. Voice is strong. Patient is House-Brackmann I/VI bilaterally.  CARDIOVASCULAR: Extremities are warm and well-perfused. No significant peripheral edema.  RESPIRATORY: Patient has nonlabored breathing without cough, wheeze, stridor.  PSYCHIATRIC: Patient is alert and oriented. Mood and affect appear normal.  SKIN: Warm and dry. No scalp, face, or neck lesions noted.    Procedure: Flexible Nasal Endoscopy  Indication: Chronic nasal obstruction, nasal congestion    To best visualize the sinonasal anatomy and due to the chief complaint and HPI, I proceeded with flexible fiberoptic nasal endoscopy. The bilateral nasal cavities were anesthetized and decongested. The bilateral nasal cavities were then examined using flexible fiberoptic nasal endoscope. The right nasal cavity was without masses,  polyps, or mucopurulence. The right middle turbinate and middle meatus was normal in appearance. The sphenoethmoid recess, inferior meatus, superior meatus, and frontal sinus outflow areas were clear. The left nasal cavity was without masses, polyps, or mucopurulence. The left middle turbinate and middle meatus was normal in appearance. The sphenoethmoid recess, inferior meatus, superior meatus, and frontal sinus outflow areas were clear. The sinonasal mucosa appeared normal. The nasal septum deviates to the right anteriorly and mid septum. The inferior turbinates were severely hypertrophied. The nasopharynx had a normal appearance with normal Eustachian tube openings and fossa of Rosenmuller bilaterally. Minimal adenoid tissue. The scope was removed. The patient tolerated the procedure well.    Assessment and Plan     ICD-10-CM    1. Nasal deformity, acquired  M95.0 NASAL ENDOSCOPY, DIAGNOSTIC     Adult ENT  Referral     mupirocin (BACTROBAN) 2 % external ointment      2. Nasal obstruction  J34.89 NASAL ENDOSCOPY, DIAGNOSTIC     Adult ENT  Referral     mupirocin (BACTROBAN) 2 % external ointment      3. Nasal valve collapse  J34.829 NASAL ENDOSCOPY, DIAGNOSTIC     Adult ENT  Referral     mupirocin (BACTROBAN) 2 % external ointment      4. Deviated nasal septum  J34.2 mupirocin (BACTROBAN) 2 % external ointment      5. Hypertrophy of both inferior nasal turbinates  J34.3 NASAL ENDOSCOPY, DIAGNOSTIC     Adult ENT  Referral     mupirocin (BACTROBAN) 2 % external ointment      6. Nasal vestibulitis  J34.89 NASAL ENDOSCOPY, DIAGNOSTIC     Adult ENT  Referral     Adult ENT  Referral     mupirocin (BACTROBAN) 2 % external ointment        It was my pleasure seeing Oz Atkinson today in clinic.  The patient presents with nasal obstruction due to bilateral nasal valve collapse, a right anterior and mid nasal septal deviation, and significant bilateral inferior  turbinate hypertrophy.  He does have structural anatomic issues with his nasal breathing that will likely not be fixed with medical management alone.  I do think he would potentially be a candidate for something more minimally invasive however his current insurance plan would not cover Latera for nasal valve implantation.    I will place referral to facial plastic surgery to discuss possible open septorhinoplasty to correct his nasal deformity, nasal valve collapse, and septal deviation.    He does have signs and symptoms of nasal vestibulitis greater on the right-hand side.  Will treat him with mupirocin 3 times a day as needed.    Mukesh Cabral MD  Department of Otolaryngology-Head and Neck Surgery  [facility]

## 2025-05-27 ENCOUNTER — OFFICE VISIT (OUTPATIENT)
Dept: OTOLARYNGOLOGY | Facility: CLINIC | Age: 60
End: 2025-05-27
Attending: INTERNAL MEDICINE
Payer: COMMERCIAL

## 2025-05-27 DIAGNOSIS — J34.829 NASAL VALVE COLLAPSE: ICD-10-CM

## 2025-05-27 DIAGNOSIS — J34.89 NASAL OBSTRUCTION: ICD-10-CM

## 2025-05-27 DIAGNOSIS — M95.0 NASAL DEFORMITY, ACQUIRED: Primary | ICD-10-CM

## 2025-05-27 DIAGNOSIS — J34.89 NASAL VESTIBULITIS: ICD-10-CM

## 2025-05-27 DIAGNOSIS — J34.2 DEVIATED NASAL SEPTUM: ICD-10-CM

## 2025-05-27 DIAGNOSIS — J34.3 HYPERTROPHY OF BOTH INFERIOR NASAL TURBINATES: ICD-10-CM

## 2025-05-27 PROCEDURE — 31231 NASAL ENDOSCOPY DX: CPT | Performed by: OTOLARYNGOLOGY

## 2025-05-27 PROCEDURE — 99204 OFFICE O/P NEW MOD 45 MIN: CPT | Mod: 25 | Performed by: OTOLARYNGOLOGY

## 2025-05-27 RX ORDER — CLOBETASOL PROPIONATE 0.5 MG/G
CREAM TOPICAL
COMMUNITY
Start: 2024-08-02

## 2025-05-27 RX ORDER — MUPIROCIN 20 MG/G
OINTMENT TOPICAL 3 TIMES DAILY PRN
Qty: 30 G | Refills: 3 | Status: SHIPPED | OUTPATIENT
Start: 2025-05-27

## 2025-05-27 RX ORDER — TRIAMCINOLONE ACETONIDE 1 MG/G
CREAM TOPICAL
COMMUNITY
Start: 2024-04-02

## 2025-05-27 NOTE — LETTER
5/27/2025      Oz Atkinson  [address] [place] [address]      Dear Colleague,    Thank you for referring your patient, Oz Atkinson, to the [facility]. Please see a copy of my visit note below.    Chief Complaint   Patient presents with     Nose Problem      Deviated Septum, would like to discuss surgery. PT reports that he played a lot of contact sports. In 2020 he ran into a tree branch and was knocked out. There were no broken bones but was told that he has a deviated septum that was an older injury. Has a hard time breathing through right side, nasal pain, and scabbing. PT has a hx of ear tubes and tonsil removal in childhood.      History of Present Illness   Oz Atkinson is a 60 year old male who presents today for evaluation. I am seeing this patient in consultation for deviated nasal septum at the request of the provider Dr. Carlos Ferreira. The patient describes symptoms of bilateral nasal obstruction for the past several years. The patient notes symptoms most predominately on the right side.  No significant history of epistaxis. The patient notes no past history of seasonal or environmental allergies. The patient has not been tested for allergies in the past. Treatments have included nasal saline and nasal steroids. The treatments seem to help very little.  He has had multiple previous episodes of nasal trauma, most recently in 2020 when his face was struck by a branch on a tree.  No prior history of nose or sinus surgery. No history of smoking. Patient reports bilateral nasal congestion and post nasal drainage.  He denies any significant rhinorrhea, taste/smell disturbance, or face pain/pressure/fullness.    He does have a history of obstructive sleep apnea and is currently trying to get back on CPAP.  He struggled with CPAP tolerance previously not being able to tolerate nasal mask due to his nasal breathing.    Past Medical History  Patient Active  Problem List   Diagnosis     Sinus Bradycardia     Essential Hypertension     Adult Sleep Apnea     General medical exam     Chronic kidney disease, stage 3a (H)     STEMI (ST elevation myocardial infarction) (H)     Dyslipidemia     Coronary artery disease without angina pectoris     Current Medications     Current Outpatient Medications:      clobetasol propionate (TEMOVATE) 0.05 % external cream, apply a thin layer topically TO eczema on leg twice daily for 14 days. THEN apply as needed for flares, Disp: , Rfl:      mupirocin (BACTROBAN) 2 % external ointment, Apply topically 3 times daily as needed (Nasal dryness/crusting)., Disp: 30 g, Rfl: 3     triamcinolone (KENALOG) 0.1 % external cream, , Disp: , Rfl:      aspirin 81 mg chewable tablet, [ASPIRIN 81 MG CHEWABLE TABLET] 81 mg., Disp: , Rfl:      atorvastatin (LIPITOR) 80 MG tablet, Take 1 tablet (80 mg) by mouth daily, Disp: 90 tablet, Rfl: 11     fluticasone-salmeterol (ADVAIR) 250-50 MCG/ACT inhaler, Inhale 1 puff into the lungs 2 times daily, Disp: 1 each, Rfl: 11     hydrochlorothiazide (HYDRODIURIL) 25 MG tablet, Take 25 mg by mouth daily., Disp: , Rfl:      losartan (COZAAR) 100 MG tablet, Take 1 tablet (100 mg) by mouth daily, Disp: 90 tablet, Rfl: 11    Allergies  No Known Allergies    Social History   Social History     Socioeconomic History     Marital status:    Tobacco Use     Smoking status: Never     Smokeless tobacco: Never     Tobacco comments:     parents smoked   Substance and Sexual Activity     Alcohol use: Yes     Alcohol/week: 3.0 standard drinks of alcohol     Drug use: Never     Sexual activity: Yes     Partners: Female     Birth control/protection: Post-menopausal   Other Topics Concern     Parent/sibling w/ CABG, MI or angioplasty before 65F 55M? No     Social Drivers of Health     Financial Resource Strain: Low Risk  (7/27/2024)    Financial Resource Strain      Within the past 12 months, have you or your family members you  live with been unable to get utilities (heat, electricity) when it was really needed?: No   Food Insecurity: Low Risk  (7/27/2024)    Food Insecurity      Within the past 12 months, did you worry that your food would run out before you got money to buy more?: No      Within the past 12 months, did the food you bought just not last and you didn t have money to get more?: No   Transportation Needs: Low Risk  (7/27/2024)    Transportation Needs      Within the past 12 months, has lack of transportation kept you from medical appointments, getting your medicines, non-medical meetings or appointments, work, or from getting things that you need?: No   Physical Activity: Sufficiently Active (7/27/2024)    Exercise Vital Sign      Days of Exercise per Week: 2 days      Minutes of Exercise per Session: 120 min   Stress: Stress Concern Present (7/27/2024)    [employer] - Occupational Stress Questionnaire      Feeling of Stress : Rather much   Social Connections: Unknown (7/27/2024)    Social Connection and Isolation Panel [NHANES]      Frequency of Social Gatherings with Friends and Family: Three times a week   Interpersonal Safety: Low Risk  (8/1/2024)    Interpersonal Safety      Do you feel physically and emotionally safe where you currently live?: Yes      Within the past 12 months, have you been hit, slapped, kicked or otherwise physically hurt by someone?: No      Within the past 12 months, have you been humiliated or emotionally abused in other ways by your partner or ex-partner?: No   Housing Stability: Low Risk  (7/27/2024)    Housing Stability      Do you have housing? : Yes      Are you worried about losing your housing?: No       Family History  Family History   Problem Relation Age of Onset     Hypertension Mother      Other Cancer Mother         Pancreatic     Diabetes Father      Coronary Artery Disease Father      Hypertension Father      Cerebrovascular Disease Maternal  Grandfather      Other Cancer Sister         Primary Pulmonary Hodgkins       Review of Systems  As per HPI and PMHx, otherwise 10+ comprehensive system review is negative.    Physical Exam  There were no vitals taken for this visit.  GENERAL: The patient is a pleasant, cooperative 60 year old male in no acute distress.  HEAD: Normocephalic, atraumatic. Hair and scalp are normal.  EYES: Pupils are equal, round, reactive to light and accommodation. Extraocular movements are intact. The sclera nonicteric without injection. The extraocular structures are normal.  EARS: Normal shape and symmetry. No tenderness when palpating the mastoid or tragal areas bilaterally. No mastoid erythema or fluctuance.   NOSE: The patient has medium thickness skin.  His bony nasal dorsum is straight.  His middle third is quite pinched.  He has sagittal malpositioning of the lower lateral crura bilaterally with internal curvature bilaterally.  Gentle inhalation shows significant external nasal valve collapse greater on the left-hand side.  Modified Riana maneuver shows significant improvement in nasal breathing greater on the left.  Nares are patent.  Nasal mucosa is slightly dry.  Anterior rhinoscopy shows some mild signs of nasal vestibulitis.  The patient has a rightward anterior mid nasal septal deviation. The patient has severe/marked inferior turbinate hypertrophy bilaterally.  NEUROLOGIC: Cranial nerves II through XII are grossly intact. Voice is strong. Patient is House-Brackmann I/VI bilaterally.  CARDIOVASCULAR: Extremities are warm and well-perfused. No significant peripheral edema.  RESPIRATORY: Patient has nonlabored breathing without cough, wheeze, stridor.  PSYCHIATRIC: Patient is alert and oriented. Mood and affect appear normal.  SKIN: Warm and dry. No scalp, face, or neck lesions noted.    Procedure: Flexible Nasal Endoscopy  Indication: Chronic nasal obstruction, nasal congestion    To best visualize the sinonasal  anatomy and due to the chief complaint and HPI, I proceeded with flexible fiberoptic nasal endoscopy. The bilateral nasal cavities were anesthetized and decongested. The bilateral nasal cavities were then examined using flexible fiberoptic nasal endoscope. The right nasal cavity was without masses, polyps, or mucopurulence. The right middle turbinate and middle meatus was normal in appearance. The sphenoethmoid recess, inferior meatus, superior meatus, and frontal sinus outflow areas were clear. The left nasal cavity was without masses, polyps, or mucopurulence. The left middle turbinate and middle meatus was normal in appearance. The sphenoethmoid recess, inferior meatus, superior meatus, and frontal sinus outflow areas were clear. The sinonasal mucosa appeared normal. The nasal septum deviates to the right anteriorly and mid septum. The inferior turbinates were severely hypertrophied. The nasopharynx had a normal appearance with normal Eustachian tube openings and fossa of Rosenmuller bilaterally. Minimal adenoid tissue. The scope was removed. The patient tolerated the procedure well.    Assessment and Plan     ICD-10-CM    1. Nasal deformity, acquired  M95.0 NASAL ENDOSCOPY, DIAGNOSTIC     Adult ENT  Referral     mupirocin (BACTROBAN) 2 % external ointment      2. Nasal obstruction  J34.89 NASAL ENDOSCOPY, DIAGNOSTIC     Adult ENT  Referral     mupirocin (BACTROBAN) 2 % external ointment      3. Nasal valve collapse  J34.829 NASAL ENDOSCOPY, DIAGNOSTIC     Adult ENT  Referral     mupirocin (BACTROBAN) 2 % external ointment      4. Deviated nasal septum  J34.2 mupirocin (BACTROBAN) 2 % external ointment      5. Hypertrophy of both inferior nasal turbinates  J34.3 NASAL ENDOSCOPY, DIAGNOSTIC     Adult ENT  Referral     mupirocin (BACTROBAN) 2 % external ointment      6. Nasal vestibulitis  J34.89 NASAL ENDOSCOPY, DIAGNOSTIC     Adult ENT  Referral     Adult ENT   Referral     mupirocin (BACTROBAN) 2 % external ointment        It was my pleasure seeing Oz Atkinson today in clinic.  The patient presents with nasal obstruction due to bilateral nasal valve collapse, a right anterior and mid nasal septal deviation, and significant bilateral inferior turbinate hypertrophy.  He does have structural anatomic issues with his nasal breathing that will likely not be fixed with medical management alone.  I do think he would potentially be a candidate for something more minimally invasive however his current insurance plan would not cover Latera for nasal valve implantation.    I will place referral to facial plastic surgery to discuss possible open septorhinoplasty to correct his nasal deformity, nasal valve collapse, and septal deviation.    He does have signs and symptoms of nasal vestibulitis greater on the right-hand side.  Will treat him with mupirocin 3 times a day as needed.    Mukesh Cabral MD  Department of Otolaryngology-Head and Neck Surgery  [facility]     Again, thank you for allowing me to participate in the care of your patient.        Sincerely,        Mukesh Cabral MD    Electronically signed

## 2025-05-28 ENCOUNTER — PATIENT OUTREACH (OUTPATIENT)
Dept: CARE COORDINATION | Facility: CLINIC | Age: 60
End: 2025-05-28
Payer: COMMERCIAL

## 2025-06-05 NOTE — PROGRESS NOTES
Facial Plastic and Reconstructive Surgery Consultation  Department of Otolaryngology  [facility]    Re: Oz Atkinson    : 1965    MRN: [id number]    Dear Dr. Cabral,    Thank you for asking me to see your patient, Oz Atkinson, in consultation to evaluate his nasal obstruction.  Today I had the pleasure of seeing him at the Facial Plastic and Reconstructive Surgery Clinic in the Department of Otolaryngology at [facility].    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  Medical Decision Making (MDM): (stable chronic problem or illness).   The nasal obstruction results from the anatomic abnormalities described above. I recommend a course of medical therapy using steroid nasal sprays. I have asked Mr. Atkinson to follow up with me in 4-6 weeks to update me on his response to medical therapy. If medical treatment proves ineffective, I would recommend nasal surgery to address the anatomic abnormalities described above.  Care Checklist:  _Nasal steroid trial, then follow up in 4-6 weeks.  _Observe nasal function and nasal cycle.  _Administer NOSE evaluation at every visit.   _Perform nasal endoscopy at next visit to look for a posterior cause of nasal obstruction, if he fails to improve from medical treatment.  _If medical treatment proves ineffective, operative plan includes: septoplasty (CPT 28599) and turbinate reduction (CPT 05462 turbinate  out-fracture).  _Conchal cartilage harvest and grafting from either or both ears (CPT 37612) and/or possible temporal or mastoid fascia harvest and grafting from either side (CPT 07638) would only be performed if there were unexpected findings during the nasal surgery and additional grafting materials are necessary to rebuild the nose. But preoperatively, the need for graft harvest and additional grafting is unlikely.   _Surgery is expected to only provide a partial benefit in breathing. Nasal steroid spray use and even further allergy treatment may be needed after surgery to obtain an optimal result.   _Monitor scabbing and purulence of the right vestibule. Update 6/12/25: this has resolved with mupirocin ointment. No ulceration on exam.     2.) Obstructive Sleep Apnea (LUIS)  Medical Decision Making: Having LUIS places you at higher risk of heart or lung problems during or after any surgery. If you wear a CPAP mask, the pressure from the mask or straps can injure the surgery site. Had sleep study many years ago and the results are not available today.   Care Checklist:  _Do not use CPAP postoperatively during the initial phases of healing.  _We will reevaluate sleep symptoms after surgery is complete and nasal breathing is optimized and refer back to sleep medicine if sleep symptoms persist because correction of anatomic nasal obstruction may impact sleep and would likely make CPAP treatment more effective.   _Expected outcome after any nasal surgery: Nasal surgery can help the airflow with CPAP use; however, nasal surgery will not cure sleep apnea. CPAP may be needed after nasal surgery.   _Upcoming sleep study.   _Follow CPAP use and function: Currently not using CPAP and stopped in 2011 because he could not tolerate the nasal mask.     3.) Comorbidities increasing the risk of surgery  Medical Decision Making: (stable chronic problem or illness).  We discussed how the patient's other health conditions increases  the risk of complications during or after surgery. Recommendations to mitigate those risks are outlined in the care checklist below.    Care Checklist:  _Mildly limited neck extension and rotation from cervical stenosis. No associated pain and still is quite active in sports: Position on the OR table awake to ensure back and neck comfort.   _HTN  _CAD with stents.  _Not currently taking ASA. Does not need to take anticoagulation for the stents.     Background/Connection:   Preferred name = Elias  What is most important to know about you as a person? (No medical information) Family is important.     Photographs: [facility] consents signed June 12, 2025     It has been a pleasure to participate in the care of Mr. Atkinson.     Sincerely,    Nickolas Davison MD  Facial Plastic and Reconstructive Surgeon  Department of Otolaryngology  [facility]        HPI     Mr. Atkinson is an 60 year old-year-old male who presents with nasal congestion.     He first noticed nasal obstruction or congestion for many years that worsened after blunt nasal trauma which he sustained in 2020 when he struck a branch of a tree when chasing after his dog.    Right. Is the congestion worse on one side or the other?  YES. Allergic symptoms?  Only when working in the yard for a while. He gets sneezing.   No. Previous nasal surgery? If yes, what surgery and when? N/A   YES. Previous nasal trauma? If yes, what happened and when? See above.     No. Previous nasal steroid spray treatment?  Only used saline.     No. Do you have any concerns about the appearance of your nose?   If any concerns are present, document their words: N/A     YES. Concern for sleep apnea? Has history of sleep apnea and has used CPAP in the past. See above.       PAST MEDICAL HISTORY     Past Medical History:   Diagnosis Date    Heart disease 6/24/2015    Single stent, LAD Artery    Hypertension 1980    Uncomplicated asthma 2005       PAST SURGICAL HISTORY     Past Surgical  History:   Procedure Laterality Date    ENT SURGERY  Multiple    Tonsillectomy 1970, multiple ear tubes, tympanoplasty in 1980       ALLERGIES     Patient has no known allergies.    MEDICATIONS     Current Outpatient Medications   Medication Sig Dispense Refill    aspirin 81 mg chewable tablet [ASPIRIN 81 MG CHEWABLE TABLET] 81 mg.      atorvastatin (LIPITOR) 80 MG tablet Take 1 tablet (80 mg) by mouth daily 90 tablet 11    clobetasol propionate (TEMOVATE) 0.05 % external cream apply a thin layer topically TO eczema on leg twice daily for 14 days. THEN apply as needed for flares      fluticasone-salmeterol (ADVAIR) 250-50 MCG/ACT inhaler Inhale 1 puff into the lungs 2 times daily 1 each 11    hydrochlorothiazide (HYDRODIURIL) 25 MG tablet Take 25 mg by mouth daily.      losartan (COZAAR) 100 MG tablet Take 1 tablet (100 mg) by mouth daily 90 tablet 11    mupirocin (BACTROBAN) 2 % external ointment Apply topically 3 times daily as needed (Nasal dryness/crusting). 30 g 3    triamcinolone (KENALOG) 0.1 % external cream        No. Do you take any blood thinners?      PHYSICAL EXAM   A standard nasal exam was performed. Pertinent findings are documented in the impression and recommendations section above. Pertinent negative findings on exam include:  No: septal perforation  No: nasal polyps  No: nasal mass  No: bleeding  No: purulence    Standard nasal and facial aesthetics were observed including skin quality, projection, rotation, forehead and chin position with the exceptions noted here or in the impression and recommendations section: asymmetries of the nostrils and face are present.     Modified Massac maneuver: the patient reported some improvement in breathing with lateralization of the right  internal nasal valve.    Remainder of physical exam:  General: Pleasant affect, normal ability to communicate  Oral cavity/oropharynx: Normal mouth opening. No OC/OP masses.  Respiratory: NAD, no stridor, voice  strong      SIGNATURE   MA transcription of patient information: I reviewed the information the MA transcribed from the patient that is documented below.    Signature: Nickolas Davison MD MA/RN Documentation Section     QUESTIONNAIRE     Background/Connection:   Preferred name = Elias  What is most important to know about you as a person? (No medical information) Family is important.       When did you first notice your nose problem? Had it his whole life - has been worsening since 2020 when he ran into a tree.    Right. Is the congestion worse on one side or the other?     Is the congestion constant or intermittent? constant.    Prior nasal surgery or trauma:  No. Have you had nose surgery before?   Unsure. Have you broken your nose before?   If yes, when? N/A     Prior nasal treatment:  YES. Have you tried any nose sprays before?   If yes, what was name of the spray? Saline mist   When did you start using it? Past couple years   How long did you use the spray? Continues to use it intermittently   Did the sprays help? They only helped for a short time, not long enough to matter  YES. Have you tried breathe-right strips?    Nasal appearance:   No. Do you have any concerns about the appearance of your nose?   If any concerns are present, document their words: N/A     Allergies:  No. Do you have a history of allergies?   No. Do you have frequent sneezing?  No. Do you have frequent runny nose?   No. Do you have watery or itchy eyes?     Sinusitis:  No. Do you get sinus infections? How many infections per year? N/A   No. Do you have facial pain or pressure?   No. Difficulty with smell or reduced sense of smell?  YES. Post nasal drip or runny nose down the back of your throat?  No. Tooth pain?  No. Fevers?    Sleep apnea: (STOP test)  YES. Snoring: Do you snore loudly (louder than talking or loud enough to be heard through closed doors)?  YES. Tired: Do you often feel tired, fatigued, or sleepy during daytime?  YES.  Observed: Has anyone observed you stop breathing during your sleep?  YES. - Losartan Blood Pressure: Do you have or are you being treated for high blood pressure?       Nasal Obstruction Symptom Evaluation (NOSE QUESTIONNAIRE):     2 - a moderate problem: Nasal congestion or stuffiness?  2 - a moderate problem: Nasal blockage or obstruction?  2 - a moderate problem: Trouble breathing through his nose?  2 - a moderate problem: Trouble sleeping?  2 - a moderate problem: Inability to get enough air through his nose during exercise or exertion?    10 = Total NOSE score      SURGICAL RISK FACTORS   Do you currently have or have you ever had in the past:  No. Do you currently smoke?  No. Problems with sedation, anesthesia, or surgery.  No. Use blood thinners.  No. Diabetes.  YES. - 1 stent Any heart problems.   No. Chest pain.  No. Do you get short of breath if you climb up 2 flights of stairs?  No. A pacemaker.  No. Any implanted device in your body.   No. Any lung problems.   No. Any kidney problems.   No. Any liver problems.   No. Hepatitis.    No. HIV or AIDS.  No. Chronic pain anywhere.  No. Problems with excessive bleeding or a bleeding disorder.  No. Problems with blood clots or a clotting disorder.   YES. - tried CPAP in 2010 but couldn't tolerate it Sleep apnea or sleep with a CPAP machine.  No. Any struggles with addiction?   No. Family history of excessive bleeding or a bleeding disorder?    No. Family history of blood clots or a clotting disorder?  No. Excessive scarring.   No. Isotretinoin (Accutaine) in the last year.  No. Difficulty urinating.   No. Anxiety.   No. Depression.   No. Allergies to medicines.  No. Joint replacement.  No. Have you been told you need to take antibiotics before surgery?    Signature: Gaudencio Burt, EMT

## 2025-06-11 ENCOUNTER — TELEPHONE (OUTPATIENT)
Dept: SLEEP MEDICINE | Facility: CLINIC | Age: 60
End: 2025-06-11

## 2025-06-11 ENCOUNTER — TELEPHONE (OUTPATIENT)
Dept: SLEEP MEDICINE | Facility: CLINIC | Age: 60
End: 2025-06-11
Payer: COMMERCIAL

## 2025-06-11 DIAGNOSIS — I25.10 CORONARY ARTERY DISEASE INVOLVING NATIVE CORONARY ARTERY OF NATIVE HEART WITHOUT ANGINA PECTORIS: ICD-10-CM

## 2025-06-11 DIAGNOSIS — G47.33 OSA (OBSTRUCTIVE SLEEP APNEA): Primary | ICD-10-CM

## 2025-06-11 DIAGNOSIS — I10 ESSENTIAL HYPERTENSION: ICD-10-CM

## 2025-06-11 NOTE — TELEPHONE ENCOUNTER
Called patient to notify of insurance denial and to schedule Hst. Left vm. Will follow up at later time.

## 2025-06-11 NOTE — TELEPHONE ENCOUNTER
Hi, provider, how would you like to proceed?        Summary: PSG Split non covered   [employer] Denial Notification     Patient Name:                        Oz Atkinson  :                                       1965  MRN:                                       [id number]     Dr. Brooke Ayala & Team,     The authorization for procedure PSG split on date of service 25 has been denied by the patient's insurance for the following reason:     Denial Reason:      Below is the information we provided to the patient's insurance company:     Order:                          Sleep Order  Visit Notes:                  Progress Note - Sleeps  Results:                       Internal Med progress note  Other:                          N/A     An appeal can be filed for possible decision reversal. This can take up to 30 days for the insurance to process once submitted. If you wish to proceed with an appeal, please provide additional clinical records and a letter explaining why you feel this service is medically necessary.     Thank you,     Leann Way Prior Authorization

## 2025-06-12 ENCOUNTER — OFFICE VISIT (OUTPATIENT)
Dept: OTOLARYNGOLOGY | Facility: CLINIC | Age: 60
End: 2025-06-12
Attending: OTOLARYNGOLOGY
Payer: COMMERCIAL

## 2025-06-12 DIAGNOSIS — J34.829 NASAL VALVE COLLAPSE: ICD-10-CM

## 2025-06-12 DIAGNOSIS — J34.3 HYPERTROPHY OF BOTH INFERIOR NASAL TURBINATES: ICD-10-CM

## 2025-06-12 DIAGNOSIS — J34.89 NASAL OBSTRUCTION: ICD-10-CM

## 2025-06-12 DIAGNOSIS — J34.89 NASAL VESTIBULITIS: ICD-10-CM

## 2025-06-12 DIAGNOSIS — M95.0 NASAL DEFORMITY, ACQUIRED: ICD-10-CM

## 2025-06-12 RX ORDER — FLUTICASONE PROPIONATE 50 MCG
1 SPRAY, SUSPENSION (ML) NASAL DAILY
Qty: 16 G | Refills: 3 | Status: SHIPPED | OUTPATIENT
Start: 2025-06-12

## 2025-06-12 NOTE — LETTER
2025      Oz Atkinson  [address] [place] [address]      Dear Colleague,    Thank you for referring your patient, Oz Atkinson, to the [facility]. Please see a copy of my visit note below.    Facial Plastic and Reconstructive Surgery Consultation  Department of Otolaryngology  [facility]    Re: Oz Atkinson    : 1965    MRN: [id number]    Dear Dr. Cabral,    Thank you for asking me to see your patient, Oz Atkinson, in consultation to evaluate his nasal obstruction.  Today I had the pleasure of seeing him at the Facial Plastic and Reconstructive Surgery Clinic in the Department of Otolaryngology at [facility].    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  Medical Decision Making (MDM): (stable chronic problem or illness).   The nasal obstruction results from the anatomic abnormalities described above. I recommend a course of medical therapy using steroid nasal sprays. I have asked Mr. Atkinson to follow up with me in 4-6 weeks to update me on his response to medical therapy. If medical treatment proves ineffective, I would recommend nasal surgery to address the anatomic abnormalities described above.  Care Checklist:  _Nasal steroid trial, then follow up in 4-6 weeks.  _Observe nasal function and nasal cycle.  _Administer NOSE evaluation at every visit.   _Perform nasal endoscopy at next  visit to look for a posterior cause of nasal obstruction, if he fails to improve from medical treatment.  _If medical treatment proves ineffective, operative plan includes: septoplasty (CPT 74811) and turbinate reduction (CPT 91964 turbinate out-fracture).  _Conchal cartilage harvest and grafting from either or both ears (CPT 81522) and/or possible temporal or mastoid fascia harvest and grafting from either side (CPT 79001) would only be performed if there were unexpected findings during the nasal surgery and additional grafting materials are necessary to rebuild the nose. But preoperatively, the need for graft harvest and additional grafting is unlikely.   _Surgery is expected to only provide a partial benefit in breathing. Nasal steroid spray use and even further allergy treatment may be needed after surgery to obtain an optimal result.   _Monitor scabbing and purulence of the right vestibule. Update 6/12/25: this has resolved with mupirocin ointment. No ulceration on exam.     2.) Obstructive Sleep Apnea (LUIS)  Medical Decision Making: Having LUIS places you at higher risk of heart or lung problems during or after any surgery. If you wear a CPAP mask, the pressure from the mask or straps can injure the surgery site. Had sleep study many years ago and the results are not available today.   Care Checklist:  _Do not use CPAP postoperatively during the initial phases of healing.  _We will reevaluate sleep symptoms after surgery is complete and nasal breathing is optimized and refer back to sleep medicine if sleep symptoms persist because correction of anatomic nasal obstruction may impact sleep and would likely make CPAP treatment more effective.   _Expected outcome after any nasal surgery: Nasal surgery can help the airflow with CPAP use; however, nasal surgery will not cure sleep apnea. CPAP may be needed after nasal surgery.   _Upcoming sleep study.   _Follow CPAP use and function: Currently not using CPAP and  stopped in 2011 because he could not tolerate the nasal mask.     3.) Comorbidities increasing the risk of surgery  Medical Decision Making: (stable chronic problem or illness).  We discussed how the patient's other health conditions increases the risk of complications during or after surgery. Recommendations to mitigate those risks are outlined in the care checklist below.    Care Checklist:  _Mildly limited neck extension and rotation from cervical stenosis. No associated pain and still is quite active in sports: Position on the OR table awake to ensure back and neck comfort.   _HTN  _CAD with stents.  _Not currently taking ASA. Does not need to take anticoagulation for the stents.     Background/Connection:   Preferred name = Elias  What is most important to know about you as a person? (No medical information) Family is important.     Photographs: [facility] consents signed June 12, 2025     It has been a pleasure to participate in the care of Mr. Atkinson.     Sincerely,    Nickolas Davison MD  Facial Plastic and Reconstructive Surgeon  Department of Otolaryngology  [facility]        HPI     Mr. Atkinson is an 60 year old-year-old male who presents with nasal congestion.     He first noticed nasal obstruction or congestion for many years that worsened after blunt nasal trauma which he sustained in 2020 when he struck a branch of a tree when chasing after his dog.    Right. Is the congestion worse on one side or the other?  YES. Allergic symptoms?  Only when working in the yard for a while. He gets sneezing.   No. Previous nasal surgery? If yes, what surgery and when? N/A   YES. Previous nasal trauma? If yes, what happened and when? See above.     No. Previous nasal steroid spray treatment?  Only used saline.     No. Do you have any concerns about the appearance of your nose?   If any concerns are present, document their words: N/A     YES. Concern for sleep apnea? Has history of sleep apnea and has used CPAP  in the past. See above.       PAST MEDICAL HISTORY     Past Medical History:   Diagnosis Date     Heart disease 6/24/2015    Single stent, LAD Artery     Hypertension 1980     Uncomplicated asthma 2005       PAST SURGICAL HISTORY     Past Surgical History:   Procedure Laterality Date     ENT SURGERY  Multiple    Tonsillectomy 1970, multiple ear tubes, tympanoplasty in 1980       ALLERGIES     Patient has no known allergies.    MEDICATIONS     Current Outpatient Medications   Medication Sig Dispense Refill     aspirin 81 mg chewable tablet [ASPIRIN 81 MG CHEWABLE TABLET] 81 mg.       atorvastatin (LIPITOR) 80 MG tablet Take 1 tablet (80 mg) by mouth daily 90 tablet 11     clobetasol propionate (TEMOVATE) 0.05 % external cream apply a thin layer topically TO eczema on leg twice daily for 14 days. THEN apply as needed for flares       fluticasone-salmeterol (ADVAIR) 250-50 MCG/ACT inhaler Inhale 1 puff into the lungs 2 times daily 1 each 11     hydrochlorothiazide (HYDRODIURIL) 25 MG tablet Take 25 mg by mouth daily.       losartan (COZAAR) 100 MG tablet Take 1 tablet (100 mg) by mouth daily 90 tablet 11     mupirocin (BACTROBAN) 2 % external ointment Apply topically 3 times daily as needed (Nasal dryness/crusting). 30 g 3     triamcinolone (KENALOG) 0.1 % external cream        No. Do you take any blood thinners?      PHYSICAL EXAM   A standard nasal exam was performed. Pertinent findings are documented in the impression and recommendations section above. Pertinent negative findings on exam include:  No: septal perforation  No: nasal polyps  No: nasal mass  No: bleeding  No: purulence    Standard nasal and facial aesthetics were observed including skin quality, projection, rotation, forehead and chin position with the exceptions noted here or in the impression and recommendations section: asymmetries of the nostrils and face are present.     Modified [place] maneuver: the patient reported some improvement in breathing  with lateralization of the right  internal nasal valve.    Remainder of physical exam:  General: Pleasant affect, normal ability to communicate  Oral cavity/oropharynx: Normal mouth opening. No OC/OP masses.  Respiratory: NAD, no stridor, voice strong      SIGNATURE   MA transcription of patient information: I reviewed the information the MA transcribed from the patient that is documented below.    Signature: Nickolas Davison MD MA/RN Documentation Section     QUESTIONNAIRE     Background/Connection:   Preferred name = Elias  What is most important to know about you as a person? (No medical information) Family is important.       When did you first notice your nose problem? Had it his whole life - has been worsening since 2020 when he ran into a tree.    Right. Is the congestion worse on one side or the other?     Is the congestion constant or intermittent? constant.    Prior nasal surgery or trauma:  No. Have you had nose surgery before?   Unsure. Have you broken your nose before?   If yes, when? N/A     Prior nasal treatment:  YES. Have you tried any nose sprays before?   If yes, what was name of the spray? Saline mist   When did you start using it? Past couple years   How long did you use the spray? Continues to use it intermittently   Did the sprays help? They only helped for a short time, not long enough to matter  YES. Have you tried breathe-right strips?    Nasal appearance:   No. Do you have any concerns about the appearance of your nose?   If any concerns are present, document their words: N/A     Allergies:  No. Do you have a history of allergies?   No. Do you have frequent sneezing?  No. Do you have frequent runny nose?   No. Do you have watery or itchy eyes?     Sinusitis:  No. Do you get sinus infections? How many infections per year? N/A   No. Do you have facial pain or pressure?   No. Difficulty with smell or reduced sense of smell?  YES. Post nasal drip or runny nose down the back of your  throat?  No. Tooth pain?  No. Fevers?    Sleep apnea: (STOP test)  YES. Snoring: Do you snore loudly (louder than talking or loud enough to be heard through closed doors)?  YES. Tired: Do you often feel tired, fatigued, or sleepy during daytime?  YES. Observed: Has anyone observed you stop breathing during your sleep?  YES. - Losartan Blood Pressure: Do you have or are you being treated for high blood pressure?       Nasal Obstruction Symptom Evaluation (NOSE QUESTIONNAIRE):     2 - a moderate problem: Nasal congestion or stuffiness?  2 - a moderate problem: Nasal blockage or obstruction?  2 - a moderate problem: Trouble breathing through his nose?  2 - a moderate problem: Trouble sleeping?  2 - a moderate problem: Inability to get enough air through his nose during exercise or exertion?    10 = Total NOSE score      SURGICAL RISK FACTORS   Do you currently have or have you ever had in the past:  No. Do you currently smoke?  No. Problems with sedation, anesthesia, or surgery.  No. Use blood thinners.  No. Diabetes.  YES. - 1 stent Any heart problems.   No. Chest pain.  No. Do you get short of breath if you climb up 2 flights of stairs?  No. A pacemaker.  No. Any implanted device in your body.   No. Any lung problems.   No. Any kidney problems.   No. Any liver problems.   No. Hepatitis.    No. HIV or AIDS.  No. Chronic pain anywhere.  No. Problems with excessive bleeding or a bleeding disorder.  No. Problems with blood clots or a clotting disorder.   YES. - tried CPAP in 2010 but couldn't tolerate it Sleep apnea or sleep with a CPAP machine.  No. Any struggles with addiction?   No. Family history of excessive bleeding or a bleeding disorder?    No. Family history of blood clots or a clotting disorder?  No. Excessive scarring.   No. Isotretinoin (Accutaine) in the last year.  No. Difficulty urinating.   No. Anxiety.   No. Depression.   No. Allergies to medicines.  No. Joint replacement.  No. Have you been told you  need to take antibiotics before surgery?    Signature: Gaudencio Burt, EMT    Again, thank you for allowing me to participate in the care of your patient.        Sincerely,        Nickolas Davison MD    Electronically signed

## 2025-06-12 NOTE — PATIENT INSTRUCTIONS
INSTRUCTIONS:    Nasal steroid trial: use the nasal steroid spray in each nostril two times a day (usually first thing in the morning and then before going to bed). Please use these medications for at least 4 weeks. Let Dr. Davison know at your follow up visit whether the steroid nasal spray improved your nasal breathing.   Allergies and surgery: Allergies likely play a role in your nasal congestion. Surgery will not improve these allergies. However, because of the degree of anatomy problems with your nose, it is reasonable to consider surgery to improve the abnormal anatomy. Please understand that even with the most successful surgery, some degree of nasal congestion is expected to be present after surgery because of these allergies and you will need an allergist to help you address the allergic component of your nasal congestion. If you would like to learn more about the allergic component of your congestion before any surgery, please see an allergist.   Observe your nasal cycle: The nasal cycle refers to the normal cycle of the nose with alternating stuffiness between the sides of the nose. Most people experience this alternating stuffiness but never notice any troubles breathing through their nose because one side is always open and able to breathe comfortably. For example, a person at the beginning of the day may notice their left nasal passage is stuffy but they breathe comfortably through the right nasal passage. Later in the day, their nose would have  cycled  and now their alternate nostril- the right nostril- is stuffy and the left nostril is open and they are breathing comfortably through the left side. Even later in the day, the stuffiness may have switched back to the left side.  This alternating stuffiness between the sides of the nose known as the nasal cycle and it is normal. This is important to understand because breathing through both nostrils at the same time is often not possible even after  surgery.  The best breathing result possible may be that you breathe comfortably through one side of your nose at a time and it switches back and forth between the sides. Please observe your nasal breathing and see how this fits with the normal nasal cycle.   Tobacco Use: all smoking should stop for 6 weeks before surgery to decrease the risk of healing problems after surgery. Even if smoking cessation is successful, the risk of healing problems after surgery is higher because of this prior tobacco use. Ask your primary care provider for help with quitting smoking. [facility] [place] to help quit. Feel free to ask us for those resources when you are ready. We will discuss your progress towards this goal at our next visit.    Please contact us at [phone] if you have questions or if we can be of service.

## 2025-06-12 NOTE — Clinical Note
Mukesh- thanks for referring Mr. Atkinson. He was hesitant about surgery and wanted to try medical management while he awaited his upcoming sleep study. I will keep you updated on his care. Thanks again! Fantasma

## 2025-07-11 NOTE — PROGRESS NOTES
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  Outcomes: NOSE score: 10 on 06/12/25.  Medical Decision Making (MDM): (stable chronic problem or illness).   The nasal obstruction results from the anatomic abnormalities described above. However, he reports normal breathing through both nasal cavities (and especially the right side) today. Because of this, I recommended against any surgery at this time and recommended that he return if his condition changes. We discussed the nasal cycle in detail.   Care Checklist:  _Observe nasal function and nasal cycle.  _Administer NOSE evaluation at every visit.      2.) Obstructive Sleep Apnea (LUIS)  Medical Decision Making: Having LUIS places you at higher risk of heart or lung problems during or after any surgery. If you wear a CPAP mask, the pressure from the mask or straps can injure the surgery site. Had sleep study many years ago and the results are not available today.   Care Checklist:  _Do not use CPAP postoperatively during the initial phases of healing.  _We will reevaluate sleep symptoms after surgery is complete and nasal breathing is optimized and refer back to sleep medicine if sleep symptoms persist because correction of anatomic nasal obstruction may impact sleep and would likely make CPAP treatment more effective.   _Expected outcome after any nasal surgery: Nasal surgery  can help the airflow with CPAP use; however, nasal surgery will not cure sleep apnea. CPAP may be needed after nasal surgery.   _Upcoming sleep study. Update 7/24/25: his appointment is still upcoming and I encouraged him to undergo this testing.    _Follow CPAP use and function: Currently not using CPAP and stopped in 2011 because he could not tolerate the nasal mask.      3.) Comorbidities increasing the risk of surgery  Medical Decision Making: (stable chronic problem or illness).  We discussed how the patient's other health conditions increases the risk of complications during or after surgery. Recommendations to mitigate those risks are outlined in the care checklist below.    Care Checklist:  _Mildly limited neck extension and rotation from cervical stenosis. No associated pain and still is quite active in sports: Position on the OR table awake to ensure back and neck comfort.   _HTN  _CAD with stents.  _Anticoagulation plan for ASA: check with your PCP about whether it is safe for you to stop ASA. If it is safe, stop ASA 7-10 days before any surgery.      Background/Connection:   Preferred name = Elias  What is most important to know about you as a person? (No medical information) Family is important.      Photographs: [facility] consents signed June 12, 2025    Nickolas Davison MD         ___________________________________________  MA/RN Section     QUESTIONNAIRE Nasal Follow-Up    What is most important for us to discuss today? Poss surgery    Any concerns or problems since our last visit? None    YES: Do you continue to have nasal congestion or obstruction?    right. Which nostril is the worse breathing side?    YES: Were you able to use the steroid nasal spray?  For how long did you use the spray: 3 weeks.  They helped a little, but not enough to matter: Did the nasal sprays help?       No. Has your overall health changed since our last visit?    No. Do you currently smoke?      Nasal Obstruction Symptom  Evaluation (NOSE QUESTIONNAIRE):     1 - a very mild problem: Nasal congestion or stuffiness?  2 - a moderate problem: Nasal blockage or obstruction?  1 - a very mild problem: Trouble breathing through his nose?  2 - a moderate problem: Trouble sleeping?  1 - a very mild problem: Inability to get enough air through his nose during exercise or exertion?      7 = Total NOSE score      Stop Bang Score: High risk for LUIS    Signature: Nidhi Rubio, [facility]      _____________________________________________________________  Dr. Davison Section     MA transcription of patient information: I reviewed the information the MA transcribed from the patient that is documented above. Signature: Nickolas Davison MD     HPI: Mr. Atkinson returns for further evaluation of their nasal function.   Do you continue to have nasal congestion or obstruction? YES.  Which nostril is the worse breathing side? right. Left side is normal. However, he is breathing normally today through the right side also.   Were you able to use the steroid nasal spray? YES.  How much improvement did you feel when using the nasal spray? a minimal but unsatisfactory benefit  Nasal Exam  A nasal exam was repeated today. The previously observed nasal exam findings documented in the impression and recommendations section above were confirmed on exam today. Any additions or modifications to the exam are documented in the impression and recommendations section above.   Modified [place] maneuver: did not test the right side today because he reports his right nasal breathing is normal and comfortable.   He is in no apparent distress. Pleasant affect. Normal ability to communicate. Normal respiratory effort. No stridor. Voice strong.      PROCEDURE- Nasal endoscopy  Indications: Examine for posterior nasal cavity causes of nasal obstruction.  The nose was topically decongested and anesthestized with a compounded spray of 3% lidocaine and 0.25% phenylephrine through  both nostrils. The nasal endoscope was passed under endoscopic vision.   Findings: normal middle turbinate architecture. No polyps or purulence. No septal perforation. No significant adenoid tissue. Eustachian tube orifices clear. No masses or lesions. No posterior nasal cause of obstruction.     Total time spent on this encounter, on the date of service, including pre-visit review of separately obtained history, face-to-face interaction performing medically appropriate physical exam, patient counseling/education, interpretation of diagnostic results, care coordination and documentation was 20 minutes.   Signature: Nickolas Davison MD

## 2025-07-24 ENCOUNTER — OFFICE VISIT (OUTPATIENT)
Dept: OTOLARYNGOLOGY | Facility: CLINIC | Age: 60
End: 2025-07-24
Payer: COMMERCIAL

## 2025-07-24 VITALS — WEIGHT: 194 LBS | HEIGHT: 71 IN | BODY MASS INDEX: 27.16 KG/M2

## 2025-07-24 DIAGNOSIS — J34.89 NASAL OBSTRUCTION: Primary | ICD-10-CM

## 2025-07-24 NOTE — NURSING NOTE
"Oz Atkinson's chief complaint for this visit includes:  Chief Complaint   Patient presents with    Follow Up     6 week post Flonase, Nasal obstruction     PCP: Carlos Ferreira    Referring Provider:  Referred Self, MD  No address on file    Ht 1.803 m (5' 11\")   Wt 88 kg (194 lb)   BMI 27.06 kg/m            "

## 2025-07-24 NOTE — LETTER
7/24/2025      Oz Atkinson  [address] [place] [address]      Dear Colleague,    Thank you for referring your patient, Oz Atkinson, to the [facility]. Please see a copy of my visit note below.    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  Outcomes: NOSE score: 10 on 06/12/25.  Medical Decision Making (MDM): (stable chronic problem or illness).   The nasal obstruction results from the anatomic abnormalities described above. However, he reports normal breathing through both nasal cavities (and especially the right side) today. Because of this, I recommended against any surgery at this time and recommended that he return if his condition changes. We discussed the nasal cycle in detail.   Care Checklist:  _Observe nasal function and nasal cycle.  _Administer NOSE evaluation at every visit.      2.) Obstructive Sleep Apnea (LUIS)  Medical Decision Making: Having LUIS places you at higher risk of heart or lung problems during or after any surgery. If you wear a CPAP mask, the pressure from the mask or straps can injure the surgery site. Had sleep study many years ago and the results are not available today.   Care Checklist:  _Do not use CPAP postoperatively during the initial phases of healing.  _We will reevaluate sleep symptoms after surgery is complete and nasal breathing is  optimized and refer back to sleep medicine if sleep symptoms persist because correction of anatomic nasal obstruction may impact sleep and would likely make CPAP treatment more effective.   _Expected outcome after any nasal surgery: Nasal surgery can help the airflow with CPAP use; however, nasal surgery will not cure sleep apnea. CPAP may be needed after nasal surgery.   _Upcoming sleep study. Update 7/24/25: his appointment is still upcoming and I encouraged him to undergo this testing.    _Follow CPAP use and function: Currently not using CPAP and stopped in 2011 because he could not tolerate the nasal mask.      3.) Comorbidities increasing the risk of surgery  Medical Decision Making: (stable chronic problem or illness).  We discussed how the patient's other health conditions increases the risk of complications during or after surgery. Recommendations to mitigate those risks are outlined in the care checklist below.    Care Checklist:  _Mildly limited neck extension and rotation from cervical stenosis. No associated pain and still is quite active in sports: Position on the OR table awake to ensure back and neck comfort.   _HTN  _CAD with stents.  _Anticoagulation plan for ASA: check with your PCP about whether it is safe for you to stop ASA. If it is safe, stop ASA 7-10 days before any surgery.      Background/Connection:   Preferred name = Elias  What is most important to know about you as a person? (No medical information) Family is important.      Photographs: [facility] consents signed June 12, 2025    Nickolas Davison MD         ___________________________________________  MA/RN Section     QUESTIONNAIRE Nasal Follow-Up    What is most important for us to discuss today? Poss surgery    Any concerns or problems since our last visit? None    YES: Do you continue to have nasal congestion or obstruction?    right. Which nostril is the worse breathing side?    YES: Were you able to use the steroid nasal spray?  For how  long did you use the spray: 3 weeks.  They helped a little, but not enough to matter: Did the nasal sprays help?       No. Has your overall health changed since our last visit?    No. Do you currently smoke?      Nasal Obstruction Symptom Evaluation (NOSE QUESTIONNAIRE):     1 - a very mild problem: Nasal congestion or stuffiness?  2 - a moderate problem: Nasal blockage or obstruction?  1 - a very mild problem: Trouble breathing through his nose?  2 - a moderate problem: Trouble sleeping?  1 - a very mild problem: Inability to get enough air through his nose during exercise or exertion?      7 = Total NOSE score      Stop Bang Score: High risk for LUIS    Signature: Nidhi Rubio, [facility]      _____________________________________________________________  Dr. Davison Section     MA transcription of patient information: I reviewed the information the MA transcribed from the patient that is documented above. Signature: Nickolas Davison MD     HPI: Mr. Atkinson returns for further evaluation of their nasal function.   Do you continue to have nasal congestion or obstruction? YES.  Which nostril is the worse breathing side? right. Left side is normal. However, he is breathing normally today through the right side also.   Were you able to use the steroid nasal spray? YES.  How much improvement did you feel when using the nasal spray? a minimal but unsatisfactory benefit  Nasal Exam  A nasal exam was repeated today. The previously observed nasal exam findings documented in the impression and recommendations section above were confirmed on exam today. Any additions or modifications to the exam are documented in the impression and recommendations section above.   Modified Riana maneuver: did not test the right side today because he reports his right nasal breathing is normal and comfortable.   He is in no apparent distress. Pleasant affect. Normal ability to communicate. Normal respiratory effort. No stridor. Voice strong.       PROCEDURE- Nasal endoscopy  Indications: Examine for posterior nasal cavity causes of nasal obstruction.  The nose was topically decongested and anesthestized with a compounded spray of 3% lidocaine and 0.25% phenylephrine through both nostrils. The nasal endoscope was passed under endoscopic vision.   Findings: normal middle turbinate architecture. No polyps or purulence. No septal perforation. No significant adenoid tissue. Eustachian tube orifices clear. No masses or lesions. No posterior nasal cause of obstruction.     Total time spent on this encounter, on the date of service, including pre-visit review of separately obtained history, face-to-face interaction performing medically appropriate physical exam, patient counseling/education, interpretation of diagnostic results, care coordination and documentation was 20 minutes.   Signature: Nickolas Davison MD     Again, thank you for allowing me to participate in the care of your patient.        Sincerely,        Nickolas Davison MD    Electronically signed